# Patient Record
Sex: MALE | Race: WHITE | NOT HISPANIC OR LATINO | Employment: OTHER | URBAN - METROPOLITAN AREA
[De-identification: names, ages, dates, MRNs, and addresses within clinical notes are randomized per-mention and may not be internally consistent; named-entity substitution may affect disease eponyms.]

---

## 2019-04-15 ENCOUNTER — HOSPITAL ENCOUNTER (INPATIENT)
Facility: MEDICAL CENTER | Age: 59
LOS: 2 days | DRG: 918 | End: 2019-04-17
Attending: EMERGENCY MEDICINE | Admitting: INTERNAL MEDICINE
Payer: COMMERCIAL

## 2019-04-15 DIAGNOSIS — R45.851 SUICIDAL IDEATION: ICD-10-CM

## 2019-04-15 DIAGNOSIS — T39.392A OVERDOSE OF NONSTEROIDAL ANTI-INFLAMMATORY DRUG (NSAID), INTENTIONAL SELF-HARM, INITIAL ENCOUNTER (HCC): ICD-10-CM

## 2019-04-15 DIAGNOSIS — F10.921 ALCOHOL INTOXICATION WITH DELIRIUM (HCC): ICD-10-CM

## 2019-04-15 PROBLEM — E07.9 THYROID DISEASE: Status: ACTIVE | Noted: 2019-04-15

## 2019-04-15 PROBLEM — E87.29 INCREASED ANION GAP METABOLIC ACIDOSIS: Status: ACTIVE | Noted: 2019-04-15

## 2019-04-15 PROBLEM — R40.0 SOMNOLENCE: Status: ACTIVE | Noted: 2019-04-15

## 2019-04-15 PROBLEM — F10.929 ALCOHOLIC INTOXICATION WITH COMPLICATION (HCC): Status: ACTIVE | Noted: 2019-04-15

## 2019-04-15 PROBLEM — T39.312A: Status: ACTIVE | Noted: 2019-04-15

## 2019-04-15 LAB
ALBUMIN SERPL BCP-MCNC: 4.8 G/DL (ref 3.2–4.9)
ALBUMIN/GLOB SERPL: 1.7 G/DL
ALP SERPL-CCNC: 121 U/L (ref 30–99)
ALT SERPL-CCNC: 50 U/L (ref 2–50)
AMPHET UR QL SCN: NEGATIVE
ANION GAP SERPL CALC-SCNC: 16 MMOL/L (ref 0–11.9)
ANION GAP SERPL CALC-SCNC: 17 MMOL/L (ref 0–11.9)
APAP SERPL-MCNC: <10 UG/ML (ref 10–30)
APPEARANCE UR: CLEAR
APTT PPP: 27.5 SEC (ref 24.7–36)
AST SERPL-CCNC: 45 U/L (ref 12–45)
BARBITURATES UR QL SCN: NEGATIVE
BASE EXCESS BLDV CALC-SCNC: -3 MMOL/L
BASOPHILS # BLD AUTO: 0.6 % (ref 0–1.8)
BASOPHILS # BLD: 0.06 K/UL (ref 0–0.12)
BENZODIAZ UR QL SCN: NEGATIVE
BILIRUB SERPL-MCNC: 0.7 MG/DL (ref 0.1–1.5)
BILIRUB UR QL STRIP.AUTO: NEGATIVE
BODY TEMPERATURE: ABNORMAL CENTIGRADE
BUN SERPL-MCNC: 12 MG/DL (ref 8–22)
BUN SERPL-MCNC: 9 MG/DL (ref 8–22)
BZE UR QL SCN: NEGATIVE
CALCIUM SERPL-MCNC: 7.8 MG/DL (ref 8.5–10.5)
CALCIUM SERPL-MCNC: 8.9 MG/DL (ref 8.5–10.5)
CANNABINOIDS UR QL SCN: NEGATIVE
CHLORIDE SERPL-SCNC: 102 MMOL/L (ref 96–112)
CHLORIDE SERPL-SCNC: 109 MMOL/L (ref 96–112)
CO2 SERPL-SCNC: 17 MMOL/L (ref 20–33)
CO2 SERPL-SCNC: 20 MMOL/L (ref 20–33)
COLOR UR: YELLOW
CREAT SERPL-MCNC: 1.2 MG/DL (ref 0.5–1.4)
CREAT SERPL-MCNC: 1.24 MG/DL (ref 0.5–1.4)
EKG IMPRESSION: NORMAL
EOSINOPHIL # BLD AUTO: 0.16 K/UL (ref 0–0.51)
EOSINOPHIL NFR BLD: 1.7 % (ref 0–6.9)
ERYTHROCYTE [DISTWIDTH] IN BLOOD BY AUTOMATED COUNT: 43.7 FL (ref 35.9–50)
ETHANOL BLD-MCNC: 0.11 G/DL
GLOBULIN SER CALC-MCNC: 2.8 G/DL (ref 1.9–3.5)
GLUCOSE SERPL-MCNC: 81 MG/DL (ref 65–99)
GLUCOSE SERPL-MCNC: 88 MG/DL (ref 65–99)
GLUCOSE UR STRIP.AUTO-MCNC: NEGATIVE MG/DL
HCO3 BLDV-SCNC: 23 MMOL/L (ref 24–28)
HCT VFR BLD AUTO: 46.9 % (ref 42–52)
HGB BLD-MCNC: 15.9 G/DL (ref 14–18)
IMM GRANULOCYTES # BLD AUTO: 0.06 K/UL (ref 0–0.11)
IMM GRANULOCYTES NFR BLD AUTO: 0.6 % (ref 0–0.9)
INR PPP: 1.24 (ref 0.87–1.13)
KETONES UR STRIP.AUTO-MCNC: NEGATIVE MG/DL
LACTATE BLD-SCNC: 1.7 MMOL/L (ref 0.5–2)
LEUKOCYTE ESTERASE UR QL STRIP.AUTO: NEGATIVE
LYMPHOCYTES # BLD AUTO: 3.63 K/UL (ref 1–4.8)
LYMPHOCYTES NFR BLD: 38.8 % (ref 22–41)
MAGNESIUM SERPL-MCNC: 2.4 MG/DL (ref 1.5–2.5)
MCH RBC QN AUTO: 31.4 PG (ref 27–33)
MCHC RBC AUTO-ENTMCNC: 33.9 G/DL (ref 33.7–35.3)
MCV RBC AUTO: 92.5 FL (ref 81.4–97.8)
METHADONE UR QL SCN: NEGATIVE
MICRO URNS: NORMAL
MONOCYTES # BLD AUTO: 0.9 K/UL (ref 0–0.85)
MONOCYTES NFR BLD AUTO: 9.6 % (ref 0–13.4)
NEUTROPHILS # BLD AUTO: 4.55 K/UL (ref 1.82–7.42)
NEUTROPHILS NFR BLD: 48.7 % (ref 44–72)
NITRITE UR QL STRIP.AUTO: NEGATIVE
NRBC # BLD AUTO: 0 K/UL
NRBC BLD-RTO: 0 /100 WBC
OPIATES UR QL SCN: NEGATIVE
OXYCODONE UR QL SCN: NEGATIVE
PCO2 BLDV: 43.5 MMHG (ref 41–51)
PCP UR QL SCN: NEGATIVE
PH BLDV: 7.34 [PH] (ref 7.31–7.45)
PH UR STRIP.AUTO: 5 [PH]
PLATELET # BLD AUTO: 310 K/UL (ref 164–446)
PMV BLD AUTO: 9 FL (ref 9–12.9)
PO2 BLDV: 49.5 MMHG (ref 25–40)
POTASSIUM SERPL-SCNC: 3.8 MMOL/L (ref 3.6–5.5)
POTASSIUM SERPL-SCNC: 4 MMOL/L (ref 3.6–5.5)
PROPOXYPH UR QL SCN: NEGATIVE
PROT SERPL-MCNC: 7.6 G/DL (ref 6–8.2)
PROT UR QL STRIP: NEGATIVE MG/DL
PROTHROMBIN TIME: 15.7 SEC (ref 12–14.6)
RBC # BLD AUTO: 5.07 M/UL (ref 4.7–6.1)
RBC UR QL AUTO: NEGATIVE
SALICYLATES SERPL-MCNC: 0 MG/DL (ref 15–25)
SAO2 % BLDV: 82.1 %
SODIUM SERPL-SCNC: 139 MMOL/L (ref 135–145)
SODIUM SERPL-SCNC: 142 MMOL/L (ref 135–145)
SP GR UR STRIP.AUTO: 1.01
TROPONIN I SERPL-MCNC: <0.01 NG/ML (ref 0–0.04)
UROBILINOGEN UR STRIP.AUTO-MCNC: 0.2 MG/DL
WBC # BLD AUTO: 9.4 K/UL (ref 4.8–10.8)

## 2019-04-15 PROCEDURE — 82272 OCCULT BLD FECES 1-3 TESTS: CPT

## 2019-04-15 PROCEDURE — 99223 1ST HOSP IP/OBS HIGH 75: CPT | Performed by: PSYCHIATRY & NEUROLOGY

## 2019-04-15 PROCEDURE — 700101 HCHG RX REV CODE 250: Performed by: STUDENT IN AN ORGANIZED HEALTH CARE EDUCATION/TRAINING PROGRAM

## 2019-04-15 PROCEDURE — C9113 INJ PANTOPRAZOLE SODIUM, VIA: HCPCS | Performed by: STUDENT IN AN ORGANIZED HEALTH CARE EDUCATION/TRAINING PROGRAM

## 2019-04-15 PROCEDURE — 85730 THROMBOPLASTIN TIME PARTIAL: CPT

## 2019-04-15 PROCEDURE — 700111 HCHG RX REV CODE 636 W/ 250 OVERRIDE (IP): Performed by: EMERGENCY MEDICINE

## 2019-04-15 PROCEDURE — 770020 HCHG ROOM/CARE - TELE (206)

## 2019-04-15 PROCEDURE — 80048 BASIC METABOLIC PNL TOTAL CA: CPT

## 2019-04-15 PROCEDURE — 700105 HCHG RX REV CODE 258: Performed by: STUDENT IN AN ORGANIZED HEALTH CARE EDUCATION/TRAINING PROGRAM

## 2019-04-15 PROCEDURE — 83735 ASSAY OF MAGNESIUM: CPT

## 2019-04-15 PROCEDURE — 99285 EMERGENCY DEPT VISIT HI MDM: CPT

## 2019-04-15 PROCEDURE — 80053 COMPREHEN METABOLIC PANEL: CPT

## 2019-04-15 PROCEDURE — 84484 ASSAY OF TROPONIN QUANT: CPT

## 2019-04-15 PROCEDURE — 85025 COMPLETE CBC W/AUTO DIFF WBC: CPT

## 2019-04-15 PROCEDURE — 80307 DRUG TEST PRSMV CHEM ANLYZR: CPT

## 2019-04-15 PROCEDURE — 96365 THER/PROPH/DIAG IV INF INIT: CPT

## 2019-04-15 PROCEDURE — A9270 NON-COVERED ITEM OR SERVICE: HCPCS | Performed by: STUDENT IN AN ORGANIZED HEALTH CARE EDUCATION/TRAINING PROGRAM

## 2019-04-15 PROCEDURE — 700111 HCHG RX REV CODE 636 W/ 250 OVERRIDE (IP): Performed by: STUDENT IN AN ORGANIZED HEALTH CARE EDUCATION/TRAINING PROGRAM

## 2019-04-15 PROCEDURE — 81003 URINALYSIS AUTO W/O SCOPE: CPT

## 2019-04-15 PROCEDURE — 36415 COLL VENOUS BLD VENIPUNCTURE: CPT

## 2019-04-15 PROCEDURE — 85610 PROTHROMBIN TIME: CPT

## 2019-04-15 PROCEDURE — 700102 HCHG RX REV CODE 250 W/ 637 OVERRIDE(OP): Performed by: STUDENT IN AN ORGANIZED HEALTH CARE EDUCATION/TRAINING PROGRAM

## 2019-04-15 PROCEDURE — 82803 BLOOD GASES ANY COMBINATION: CPT

## 2019-04-15 PROCEDURE — 99223 1ST HOSP IP/OBS HIGH 75: CPT | Mod: GC | Performed by: INTERNAL MEDICINE

## 2019-04-15 PROCEDURE — 83605 ASSAY OF LACTIC ACID: CPT

## 2019-04-15 PROCEDURE — 96375 TX/PRO/DX INJ NEW DRUG ADDON: CPT

## 2019-04-15 PROCEDURE — C9113 INJ PANTOPRAZOLE SODIUM, VIA: HCPCS | Performed by: EMERGENCY MEDICINE

## 2019-04-15 PROCEDURE — 93005 ELECTROCARDIOGRAM TRACING: CPT | Performed by: STUDENT IN AN ORGANIZED HEALTH CARE EDUCATION/TRAINING PROGRAM

## 2019-04-15 RX ORDER — LORAZEPAM 1 MG/1
1 TABLET ORAL EVERY 4 HOURS PRN
Status: DISCONTINUED | OUTPATIENT
Start: 2019-04-15 | End: 2019-04-15

## 2019-04-15 RX ORDER — FOLIC ACID 1 MG/1
1 TABLET ORAL DAILY
Status: DISCONTINUED | OUTPATIENT
Start: 2019-04-16 | End: 2019-04-15

## 2019-04-15 RX ORDER — PANTOPRAZOLE SODIUM 40 MG/10ML
40 INJECTION, POWDER, LYOPHILIZED, FOR SOLUTION INTRAVENOUS 2 TIMES DAILY
Status: DISCONTINUED | OUTPATIENT
Start: 2019-04-15 | End: 2019-04-16

## 2019-04-15 RX ORDER — LORAZEPAM 2 MG/ML
1 INJECTION INTRAMUSCULAR
Status: DISCONTINUED | OUTPATIENT
Start: 2019-04-15 | End: 2019-04-15

## 2019-04-15 RX ORDER — SODIUM CHLORIDE 9 MG/ML
500 INJECTION, SOLUTION INTRAVENOUS ONCE
Status: COMPLETED | OUTPATIENT
Start: 2019-04-15 | End: 2019-04-15

## 2019-04-15 RX ORDER — LORAZEPAM 1 MG/1
4 TABLET ORAL
Status: DISCONTINUED | OUTPATIENT
Start: 2019-04-15 | End: 2019-04-15

## 2019-04-15 RX ORDER — POTASSIUM CHLORIDE 20 MEQ/1
20 TABLET, EXTENDED RELEASE ORAL ONCE
Status: COMPLETED | OUTPATIENT
Start: 2019-04-15 | End: 2019-04-15

## 2019-04-15 RX ORDER — LORAZEPAM 2 MG/ML
1-2 INJECTION INTRAMUSCULAR
Status: DISCONTINUED | OUTPATIENT
Start: 2019-04-15 | End: 2019-04-17 | Stop reason: HOSPADM

## 2019-04-15 RX ORDER — POLYETHYLENE GLYCOL 3350 17 G/17G
1 POWDER, FOR SOLUTION ORAL
Status: DISCONTINUED | OUTPATIENT
Start: 2019-04-15 | End: 2019-04-17 | Stop reason: HOSPADM

## 2019-04-15 RX ORDER — THIAMINE MONONITRATE (VIT B1) 100 MG
100 TABLET ORAL DAILY
Status: DISCONTINUED | OUTPATIENT
Start: 2019-04-16 | End: 2019-04-15

## 2019-04-15 RX ORDER — LORAZEPAM 2 MG/ML
0.5 INJECTION INTRAMUSCULAR EVERY 4 HOURS PRN
Status: DISCONTINUED | OUTPATIENT
Start: 2019-04-15 | End: 2019-04-15

## 2019-04-15 RX ORDER — LORAZEPAM 2 MG/ML
2 INJECTION INTRAMUSCULAR
Status: DISCONTINUED | OUTPATIENT
Start: 2019-04-15 | End: 2019-04-15

## 2019-04-15 RX ORDER — SODIUM CHLORIDE 9 MG/ML
INJECTION, SOLUTION INTRAVENOUS CONTINUOUS
Status: DISCONTINUED | OUTPATIENT
Start: 2019-04-15 | End: 2019-04-17

## 2019-04-15 RX ORDER — ONDANSETRON 2 MG/ML
4 INJECTION INTRAMUSCULAR; INTRAVENOUS EVERY 4 HOURS PRN
Status: DISCONTINUED | OUTPATIENT
Start: 2019-04-15 | End: 2019-04-17 | Stop reason: HOSPADM

## 2019-04-15 RX ORDER — LORAZEPAM 1 MG/1
3 TABLET ORAL
Status: DISCONTINUED | OUTPATIENT
Start: 2019-04-15 | End: 2019-04-15

## 2019-04-15 RX ORDER — DEXTROSE MONOHYDRATE 25 G/50ML
25 INJECTION, SOLUTION INTRAVENOUS
Status: DISCONTINUED | OUTPATIENT
Start: 2019-04-15 | End: 2019-04-15

## 2019-04-15 RX ORDER — LORAZEPAM 1 MG/1
2 TABLET ORAL
Status: DISCONTINUED | OUTPATIENT
Start: 2019-04-15 | End: 2019-04-15

## 2019-04-15 RX ORDER — LORAZEPAM 1 MG/1
0.5 TABLET ORAL EVERY 4 HOURS PRN
Status: DISCONTINUED | OUTPATIENT
Start: 2019-04-15 | End: 2019-04-15

## 2019-04-15 RX ORDER — LORAZEPAM 2 MG/ML
1.5 INJECTION INTRAMUSCULAR
Status: DISCONTINUED | OUTPATIENT
Start: 2019-04-15 | End: 2019-04-15

## 2019-04-15 RX ORDER — PANTOPRAZOLE SODIUM 40 MG/10ML
80 INJECTION, POWDER, LYOPHILIZED, FOR SOLUTION INTRAVENOUS ONCE
Status: COMPLETED | OUTPATIENT
Start: 2019-04-15 | End: 2019-04-15

## 2019-04-15 RX ORDER — BISACODYL 10 MG
10 SUPPOSITORY, RECTAL RECTAL
Status: DISCONTINUED | OUTPATIENT
Start: 2019-04-15 | End: 2019-04-17 | Stop reason: HOSPADM

## 2019-04-15 RX ORDER — AMOXICILLIN 250 MG
2 CAPSULE ORAL 2 TIMES DAILY
Status: DISCONTINUED | OUTPATIENT
Start: 2019-04-15 | End: 2019-04-17 | Stop reason: HOSPADM

## 2019-04-15 RX ADMIN — PANTOPRAZOLE SODIUM 40 MG: 40 INJECTION, POWDER, LYOPHILIZED, FOR SOLUTION INTRAVENOUS at 17:34

## 2019-04-15 RX ADMIN — SODIUM CHLORIDE: 9 INJECTION, SOLUTION INTRAVENOUS at 09:33

## 2019-04-15 RX ADMIN — THIAMINE HYDROCHLORIDE: 100 INJECTION, SOLUTION INTRAMUSCULAR; INTRAVENOUS at 08:54

## 2019-04-15 RX ADMIN — SODIUM CHLORIDE 500 ML: 9 INJECTION, SOLUTION INTRAVENOUS at 08:53

## 2019-04-15 RX ADMIN — POTASSIUM CHLORIDE 20 MEQ: 1500 TABLET, EXTENDED RELEASE ORAL at 21:27

## 2019-04-15 RX ADMIN — PANTOPRAZOLE SODIUM 80 MG: 40 INJECTION, POWDER, LYOPHILIZED, FOR SOLUTION INTRAVENOUS at 05:31

## 2019-04-15 RX ADMIN — SODIUM CHLORIDE: 9 INJECTION, SOLUTION INTRAVENOUS at 17:38

## 2019-04-15 ASSESSMENT — ENCOUNTER SYMPTOMS
ABDOMINAL PAIN: 0
CONSTIPATION: 0
DIARRHEA: 0
HEARTBURN: 0
NAUSEA: 0
DEPRESSION: 1
VOMITING: 0
BLOOD IN STOOL: 0

## 2019-04-15 ASSESSMENT — COPD QUESTIONNAIRES
DO YOU EVER COUGH UP ANY MUCUS OR PHLEGM?: NO/ONLY WITH OCCASIONAL COLDS OR INFECTIONS
COPD SCREENING SCORE: 1
DURING THE PAST 4 WEEKS HOW MUCH DID YOU FEEL SHORT OF BREATH: NONE/LITTLE OF THE TIME
HAVE YOU SMOKED AT LEAST 100 CIGARETTES IN YOUR ENTIRE LIFE: NO/DON'T KNOW

## 2019-04-15 ASSESSMENT — LIFESTYLE VARIABLES
SUBSTANCE_ABUSE: 1
EVER_SMOKED: NEVER

## 2019-04-15 NOTE — SENIOR ADMIT NOTE
HPI: Patient is a 58-year-old man who was brought in by EMS from his home, it is unclear who called EMS, due to taking approximately 40 tablets of 800 mg ibuprofen in an attempt to harm himself.  Per nursing staff on arrival he was awake and talking in full sentences, making sense.  Per ED physician, he was altered, not speaking in full sentences and unable to give a full history.  They were able to understand that he took about 40 tablets of 800 mg ibuprofen.  Upon S questioning, he states that he had some epigastric pain and that is why he took the medicine.  It is still difficult to obtain history, as patient is altered and slurring his speech.    Physical exam: Overweight, somnolent, slurring his speech and not speaking in full sentences.  Answers yes or no questions only.  Satting 100% on room air, taking deep breaths.  No respiratory distress or depression.   Cardiac: Normal S1 and S2, no murmurs rubs or gallops auscultated.  Abdomen: Obese, nl. BS. Nontender.  No petechiae seen.  No signs of active bleeding.     Assessment/plan    Ibuprofen overdose  Presenting with altered mental status - no sign of GI bleed.   -Has elevated anion gap, with normal pH  -UDS negative, 0 Tylenol/salicylates in serum - per poison control possibly ingested something else as well.  -BAL 0.11  -Anion gap 17, H CO2 20  -VBG: PH 7.34, PCO2 43.5 - not retaining CO2      Plan:  Admit to telemetry  EKG  FIT  Legal hold - psych was consulted in the ED, and will see patient when no longer inebriated.   CIWA - without lorazepam while acutely intoxicated and altered.   Poison control already aware of patient, and have no further recommendations on further tests.

## 2019-04-15 NOTE — CONSULTS
Alert Team  Pt not seen by Alert Team in ER.    Pt being admitted to Tele unit for medical stabilization.  Pt to see Psychiatry while inpatient to address SI.

## 2019-04-15 NOTE — CARE PLAN
Problem: Safety  Goal: Will remain free from falls  Fall precautions in place. Bed wheels locked, bed is in the lowest position. Bed alarm on and in place. Non-skid socks provided. Patient provides successful verbalization of fall and safety precautions. Upper side rails are up. Hourly rounding in progress.     Problem: Knowledge Deficit  Goal: Knowledge of disease process/condition, treatment plan, diagnostic tests, and medications will improve  POC discussed with the patient and family. All questions and concerns addressed and answered. Patient is involved in POC and verbalizes the understanding of the disease process, medications, tests and treatments. Questions on POC encouraged throughout care.       Problem: Skin Integrity  Goal: Risk for impaired skin integrity will decrease  Skin is assessed for integrity throughout the shift. Pt is encouraged to reposition and is turned at least every 2 hours. Pt is kept dry and clean.

## 2019-04-15 NOTE — ED NOTES
Pt report called in to T7 RN, questions and comments addressed. Pt now awaiting transport to T718.

## 2019-04-15 NOTE — PSYCHIATRY
"PSYCHIATRIC INTAKE EVALUATION     *Reason for admission: reports that he took 40 800mg Ibuprofen in attempt to end his life this evening. Pt reports to worsening depression. Pt reports he has had thoughts in the past but never a previous attempt to harm himself. Pt is A&Ox4. Per EMS pt was at home when they were called.                   *Reason for consult: suicide attempt  *Requesting Physician/APN: Manish Howell II, M.D.            Legal Hold on admission: +           *Chief Complaint: too obtunded to obtained.       *HPI (includes Psychiatric ROS):  57 yo male who while intoxicated tried to overdose on motrin. One note indicates it was a suicide attempt. Another indicates his stomach was hurting so he took the motrin to stop the pain. However, if the report is accurate, the amount of pills taken puts his judgment in question or, it was a SA.     He is too obtunded when seen by psych to obtain any meaningful information. No prior visits to [facility].      *Medical Review Of Symptoms (not dx conditions): too obtunded to participate  ROS     *Psychiatric Examination:   Vitals: Blood pressure 147/84, pulse 82, temperature 35.9 °C (96.6 °F), temperature source Temporal, resp. rate 14, height 1.676 m (5' 6\"), weight 107 kg (236 lb), SpO2 93 %.  General Appearance: unable to cooperate. Obtunded.  Abnormal Movements:none noted  Gait and Posture:not observed  Speech:none  Thought Process: too obtunded to participate  Associations:too obtunded to participate  Abnormal or Psychotic Thoughts:too obtunded to participate  Judgement and Insight:poor  Orientation:too obtunded to participate  Recent and Remote Memory:too obtunded to participate  Attention Span and Concentration:poor  Language:too obtunded to participate  Fund of Knowledge:too obtunded to participate  Mood and Affect:presumably depressed given that he reported depression/blunted.  SI/HI: has reported +SI and no HI     *PAST MEDICAL/PSYCH/FAMILY/SOCIAL(as " "reported by patient):       *medical hx: too obtunded to participate           Past Medical History:   Diagnosis Date   • Disorder of thyroid      History reviewed. No pertinent surgical history.     *psychiatric hx: too obtunded to participate  SAs:per notes, no prior hx.  Guns:unknown  Hx of Violence: unknown  Hospitalizations:unknown  Med Hx:unknown  Dx Hx:unknown     *family Psych hx: too obtunded to participate      *social hx: per EMS: he was \"at home\"  Alcohol: pt denied drinking but is + for same  Drugs:denied     *MEDICAL HX: labs, MARS, medications, etc were reviewed. Only those findings of potential interest to psychiatry are noted below:    *Current Medical issues:  Intoxicated and obtunded      *Allergies:  No Known Allergies  *Current Medications:    Current Facility-Administered Medications:   •  senna-docusate (PERICOLACE or SENOKOT S) 8.6-50 MG per tablet 2 Tab, 2 Tab, Oral, BID, Stopped at 04/15/19 0715 **AND** polyethylene glycol/lytes (MIRALAX) PACKET 1 Packet, 1 Packet, Oral, QDAY PRN **AND** magnesium hydroxide (MILK OF MAGNESIA) suspension 30 mL, 30 mL, Oral, QDAY PRN **AND** bisacodyl (DULCOLAX) suppository 10 mg, 10 mg, Rectal, QDAY PRN, Talia Villegas M.D.  •  Respiratory Care per Protocol, , Nebulization, Continuous RT, Talia Villegas M.D.  •  NS infusion, , Intravenous, Continuous, Talia Villegas M.D., Last Rate: 150 mL/hr at 04/15/19 1409  •  pantoprazole (PROTONIX) injection 40 mg, 40 mg, Intravenous, BID, Talia Villegas M.D.  •  ondansetron (ZOFRAN) syringe/vial injection 4 mg, 4 mg, Intravenous, Q4HRS PRN, Talia Villegas M.D.  •  LORazepam (ATIVAN) injection 1-2 mg, 1-2 mg, Intravenous, Once PRN, Talia Villegas M.D.  *EKG: personally reviewed: [id number]  *Imaging: none   EEG:  none     *Labs:  Recent Labs      04/15/19   0323   WBC  9.4   RBC  5.07   HEMOGLOBIN  15.9   HEMATOCRIT  46.9   MCV  92.5   MCH  31.4   RDW  43.7   PLATELETCT  310   MPV  9.0   NEUTSPOLYS  48.70 "   LYMPHOCYTES  38.80   MONOCYTES  9.60   EOSINOPHILS  1.70   BASOPHILS  0.60     Lab Results   Component Value Date/Time    SODIUM 139 04/15/2019 03:23 AM    POTASSIUM 4.0 04/15/2019 03:23 AM    CHLORIDE 102 04/15/2019 03:23 AM    CO2 20 04/15/2019 03:23 AM    GLUCOSE 88 04/15/2019 03:23 AM    BUN 9 04/15/2019 03:23 AM    CREATININE 1.24 04/15/2019 03:23 AM         No results found for: BREATHALIZER  BAL 0.11     Lab Results  Component Value Date/Time   AMPHUR Negative 04/15/2019 0433   BARBSURINE Negative 04/15/2019 0433   BENZODIAZU Negative 04/15/2019 0433   COCAINEMET Negative 04/15/2019 0433   METHADONE Negative 04/15/2019 0433   OPIATES Negative 04/15/2019 0433   OXYCODN Negative 04/15/2019 0433   PCPURINE Negative 04/15/2019 0433   PROPOXY Negative 04/15/2019 0433   CANNABINOID Negative 04/15/2019 0433     No results found for: TSH, FREET4      *ASSESSMENT/PLAN:  1. Acute alcohol intoxication    2. Depressive disorder unspec  - R/O adjustment disorder with depressed mood  -R/O other mood disorder  -R/O alcohol induced mood disorder    2. R/O alcohol use disorder    - BAL 0.11    Legal hold: extended. [facility]. Right now he is being admitted to the floor but [facility] be [facility]. inpt psychiatry has been notified to follow pt: please call when pt is able to participate in an interview.    Other:   Sitter: no   Visitors:no   Phone calls:no   Personal items (specify):no  *Will Follow  *Thank you for the consult

## 2019-04-15 NOTE — H&P
Internal Medicine Admitting History and Physical    Note Author: Talia Villegas M.D.       Name Alonzo San     1960   Age/Sex 58 y.o. male   MRN [id number]   Code Status FULL     After 5PM or if no immediate response to page, please call for cross-coverage  Attending/Team: Dr. Faulkner/KENNY Vallejo See Patient List for primary contact information  Call [phone] to page    1st Call - Day Intern (R1):   Dr. Villegas 2nd Call - Day Sr. Resident (R2/R3):   Dr. Monique       Chief Complaint:   Suicidal attempt with Ibuprofen overdose    HPI:  This is a 58 years old male first time presented in [facility] ER, no known significant past medical history (patient somnolent on examination, unable to give history) was brought in for ibuprofen overdose.     Patient was somnolent during interview, unable to get any history from the patient. Per nursing staff, on arrival he was more awake and could perform in conversation. Per chart review, while talking to ER physician, he was able to make conversation but provided very minimal history. The patient stated to ER physician that he took ibuprofen in attempt to harm herself, he could not recall the time of ingestion.  Per nursing, he took 40 pills of 800 mg ibuprofen.  No previous suicidal attempts in the past, only suicidal thoughts. He was brought in by EMS (unclear who called EMS).     During H&P, somnolent, not speaking in full sentence. Only occasional 'yes' and 'no'. Breathing comfortably in RA, O2 sat > 92 %, says 'no' to presence of any pain anywhere in the body/ difficulty in breathing.     ER course: Vitals normal, Labs: grossly normal except elevated AG, normal PH. UDS negative, salisylates/ Tylenol neg, VBG: doesn't show CO2 retention. BAL 0.11. He received 80mg IV Protonix, Poision control was contacted who agree with management. Psych was called and he was started on legal hold.     Review of Systems   Reason unable to perform ROS: Limited due to patient  "condition. Patient somnolent, arousable with command, not participating properly in conversation.    Gastrointestinal: Negative for abdominal pain, blood in stool, constipation, diarrhea, heartburn, melena, nausea and vomiting.             Past Medical History (Chronic medical problem, known complications and current treatment)    No previous chart to review, patient too drowsy to give PMH    Past Surgical History:  Unable to get due to patient condition    Current Outpatient Medications:  Home Medications     Reviewed by Palak Sanchez R.N. (Registered Nurse) on 04/15/19 at 0328  Med List Status: Partial   Medication Last Dose Status        Patient Nathaniel Taking any Medications                       Medication Allergy/Sensitivities:  No Known Allergies      Family History (mandatory)   Mother: DM  MGF: DM  MGF: DM    Social History (mandatory)   Social History     Social History   • Marital status: N/A     Spouse name: N/A   • Number of children: N/A   • Years of education: N/A     Occupational History   • Not on file.     Social History Main Topics   • Smoking status: Former Smoker   • Smokeless tobacco: Never Used   • Alcohol use Yes   • Drug use: No   • Sexual activity: Not on file     Other Topics Concern   • Not on file     Social History Narrative   • No narrative on file     Living situation: Home  PCP : No primary care provider on file.    Physical Exam     Vitals:    04/15/19 0900 04/15/19 0930 04/15/19 1000 04/15/19 1045   BP:    147/84   Pulse: 84 83 86 82   Resp:    14   Temp:    35.9 °C (96.6 °F)   TempSrc:    Temporal   SpO2: 96% 96% 96% 93%   Weight:       Height:         Body mass index is 38.09 kg/m².  /84   Pulse 82   Temp 35.9 °C (96.6 °F) (Temporal)   Resp 14   Ht 1.676 m (5' 6\")   Wt 107 kg (236 lb)   SpO2 93%   BMI 38.09 kg/m²   O2 therapy: Pulse Oximetry: 93 %, O2 (LPM): 2, O2 Delivery: Silicone Nasal Cannula    Physical Exam   Constitutional: He appears lethargic. He appears " to not be writhing in pain and not dehydrated.  Non-toxic appearance. He does not have a sickly appearance.   HENT:   Head: Normocephalic and atraumatic.   Eyes: Pupils are equal, round, and reactive to light. Right eye exhibits no discharge. Left eye exhibits no discharge.   Neck: Neck supple. No JVD present.   Cardiovascular: Normal rate, regular rhythm, normal heart sounds and intact distal pulses.    No murmur heard.  Pulmonary/Chest: Effort normal and breath sounds normal. No respiratory distress. He has no wheezes. He has no rales.   Abdominal: Soft. Bowel sounds are normal. He exhibits no distension. There is no tenderness.   Musculoskeletal: He exhibits no edema or deformity.   Neurological: He appears lethargic.   Patient drowsy during PE, able to follow simple command like opening eyes but not in full participation for complex commands.    Skin: Skin is warm. No erythema.   Psychiatric: He expresses suicidal ideation. He expresses no suicidal plans and no homicidal plans.   Came after Ibuprofen OD, patient drowsy during PE         Data Review       Old Records Request:   Completed  Current Records review/summary: Completed    Lab Data Review:  Recent Results (from the past 24 hour(s))   DIAGNOSTIC ALCOHOL (BA)    Collection Time: 04/15/19  3:23 AM   Result Value Ref Range    Diagnostic Alcohol 0.11 (H) 0.00 g/dL   CBC WITH DIFFERENTIAL    Collection Time: 04/15/19  3:23 AM   Result Value Ref Range    WBC 9.4 4.8 - 10.8 K/uL    RBC 5.07 4.70 - 6.10 M/uL    Hemoglobin 15.9 14.0 - 18.0 g/dL    Hematocrit 46.9 42.0 - 52.0 %    MCV 92.5 81.4 - 97.8 fL    MCH 31.4 27.0 - 33.0 pg    MCHC 33.9 33.7 - 35.3 g/dL    RDW 43.7 35.9 - 50.0 fL    Platelet Count 310 164 - 446 K/uL    MPV 9.0 9.0 - 12.9 fL    Neutrophils-Polys 48.70 44.00 - 72.00 %    Lymphocytes 38.80 22.00 - 41.00 %    Monocytes 9.60 0.00 - 13.40 %    Eosinophils 1.70 0.00 - 6.90 %    Basophils 0.60 0.00 - 1.80 %    Immature Granulocytes 0.60 0.00 - 0.90  %    Nucleated RBC 0.00 /100 WBC    Neutrophils (Absolute) 4.55 1.82 - 7.42 K/uL    Lymphs (Absolute) 3.63 1.00 - 4.80 K/uL    Monos (Absolute) 0.90 (H) 0.00 - 0.85 K/uL    Eos (Absolute) 0.16 0.00 - 0.51 K/uL    Baso (Absolute) 0.06 0.00 - 0.12 K/uL    Immature Granulocytes (abs) 0.06 0.00 - 0.11 K/uL    NRBC (Absolute) 0.00 K/uL   COMP METABOLIC PANEL    Collection Time: 04/15/19  3:23 AM   Result Value Ref Range    Sodium 139 135 - 145 mmol/L    Potassium 4.0 3.6 - 5.5 mmol/L    Chloride 102 96 - 112 mmol/L    Co2 20 20 - 33 mmol/L    Anion Gap 17.0 (H) 0.0 - 11.9    Glucose 88 65 - 99 mg/dL    Bun 9 8 - 22 mg/dL    Creatinine 1.24 0.50 - 1.40 mg/dL    Calcium 8.9 8.5 - 10.5 mg/dL    AST(SGOT) 45 12 - 45 U/L    ALT(SGPT) 50 2 - 50 U/L    Alkaline Phosphatase 121 (H) 30 - 99 U/L    Total Bilirubin 0.7 0.1 - 1.5 mg/dL    Albumin 4.8 3.2 - 4.9 g/dL    Total Protein 7.6 6.0 - 8.2 g/dL    Globulin 2.8 1.9 - 3.5 g/dL    A-G Ratio 1.7 g/dL   Acetaminophen Level    Collection Time: 04/15/19  3:23 AM   Result Value Ref Range    Acetaminophen -Tylenol <10 10 - 30 ug/mL   SALICYLATE LEVEL    Collection Time: 04/15/19  3:23 AM   Result Value Ref Range    Salicylates, Quant. 0 (L) 15 - 25 mg/dL   ESTIMATED GFR    Collection Time: 04/15/19  3:23 AM   Result Value Ref Range    GFR If African American >60 >60 mL/min/1.73 m 2    GFR If Non African American 60 >60 mL/min/1.73 m 2   VENOUS BLOOD GAS    Collection Time: 04/15/19  4:16 AM   Result Value Ref Range    Venous Bg Ph 7.34 7.31 - 7.45    Venous Bg Pco2 43.5 41.0 - 51.0 mmHg    Venous Bg Po2 49.5 (H) 25.0 - 40.0 mmHg    Venous Bg O2 Saturation 82.1 %    Venous Bg Hco3 23 (L) 24 - 28 mmol/L    Venous Bg Base Excess -3 mmol/L    Body Temp see below Centigrade   Urine Drug Screen    Collection Time: 04/15/19  4:33 AM   Result Value Ref Range    Amphetamines Urine Negative Negative    Barbiturates Negative Negative    Benzodiazepines Negative Negative    Cocaine Metabolite  Negative Negative    Methadone Negative Negative    Opiates Negative Negative    Oxycodone Negative Negative    Phencyclidine -Pcp Negative Negative    Propoxyphene Negative Negative    Cannabinoid Metab Negative Negative   EKG    Collection Time: 04/15/19  7:23 AM   Result Value Ref Range    Report       [facility] Emergency Dept[facility]    Test Date:  2019-04-15  Pt Name:    JEAN YEUNG                 Department: ER  MRN:        [id number]                      Room:       [facility]  Gender:     Male                         Technician: [id number]  :        1960                   Requested By:BHASKAR BRIGGS  Order #:    [id number]                    Reading MD:    Measurements  Intervals                                Axis  Rate:       87                           P:          60  RI:         184                          QRS:        30  QRSD:       108                          T:          35  QT:         368  QTc:        443    Interpretive Statements  SINUS RHYTHM  INCOMPLETE RIGHT BUNDLE BRANCH BLOCK  LATE PRECORDIAL R/S TRANSITION  PROBABLE INFERIOR INFARCT, AGE INDETERMINATE  No previous ECG available for comparison     LACTIC ACID    Collection Time: 04/15/19  7:40 AM   Result Value Ref Range    Lactic Acid 1.7 0.5 - 2.0 mmol/L   TROPONIN    Collection Time: 04/15/19  7:40 AM   Result Value Ref Range    Troponin I <0.01 0.00 - 0.04 ng/mL   APTT    Collection Time: 04/15/19 11:44 AM   Result Value Ref Range    APTT 27.5 24.7 - 36.0 sec   Prothrombin Time    Collection Time: 04/15/19 11:44 AM   Result Value Ref Range    PT 15.7 (H) 12.0 - 14.6 sec    INR 1.24 (H) 0.87 - 1.13       Imaging/Procedures Review:    Independant Imaging Review: Completed  No orders to display        EKG:   EKG Independent Review: Completed  NSR, QTc 443.    Records reviewed and summarized in current documentation :  Yes  [employer] teaching service handout given to patient:  No         Assessment/Plan     * Ibuprofen poisoning,  intentional self-harm, initial encounter ([facility])   Assessment & Plan    Patient brought to ER after he overdosed with Ibuprofen pills. Per ER nursing, he took 40 of 800 mg pills. He was more A&O when he came in initially, now more somnolent. However arousable with command. Labs didn't show any gross abnormality, UDS, Salicylate/ Acetaminophen and Blood gas level normal. Breathing comfortably in room air, RR/ O2 sat normal. Poison control alerted from ER, no new recommendation.   - Given that he took 40 of 800 mg pills, (299 mg/kg for his body weight), it's unlikely to cause toxicity. He might have taken more or combination with something else. Unable to get history as he is somnolent now.  - Ibuprofen toxicity does have some neurologic dysfunction, unclear mechanism of action.   - Vitals normal, RR and pilse ox normal, Blood gas didn't show CO2 accumulation.   - Admission in telemetry, continuous pulse ox. If becomes more somnolent/ respiratory compromise, might need intubation.  - NPO until more alert.  - NS bolus of 500, then maintenance fluid with 150ml/hour for nutrition and kidney protection. No signs of volume overload on admission.   - Received 80 mg IV protonix in ER, cont 40 mg IV BID.   - Cont Legal Hold  - Psych called from ER, but patient has a BAL 0.12, reconsult/ call when no longer intoxicated.   - Fall, Aspiration, seizure precaution.   - LA, FOBT ordered, pending.      Somnolence   Assessment & Plan    See Ibuprofen overdose     Alcoholic intoxication with complication ([facility])   Assessment & Plan    Patient drowsy and somnolent, arousable with verbal command. Unable to obtain history. BAL on admission 0.11. Started CIWA protocol for monitoring withdrawal status, no Ativan prescribed yet as patient probably still intoxicated.   - Start Ativan per CIWA when appropriate.  - Rally bag and PO vitamins per protocol  - Seizure, Fall, aspiration precaution  - NPO now, bed side swallow eval when appropriate        Thyroid disease   Assessment & Plan    H/O thyroid disease, no records in file. Unsure how much supplementation he was on.   - TSH with reflex to T4 ordered.   - Start supplementation accordingly.      Increased anion gap metabolic acidosis   Assessment & Plan    Anion gap metabolic acidosis probably 2/2 ibuprofen OD  - Bal mildly elevated, not known DM. Blood glucose normal on admission. Salicylate/acetamenophen neg   - Ordered LA, pending         Anticipated Hospital stay:  >2 midnights        Quality Measures  Quality-Core Measures   Reviewed items::  EKG reviewed, Labs reviewed, Radiology images reviewed and Medications reviewed  Merritt catheter::  No Merritt  DVT prophylaxis - mechanical:  SCDs    PCP: No primary care provider on file.

## 2019-04-15 NOTE — DISCHARGE PLANNING
Medical Social Work    MIKAL received a call from Janeth Whitt, ([phone]), requesting to know if pt was admitted for suicidal ideation. Janeth reports that she is with the [facility] in [place], [place]. Janeht further reports that the pt is actively involved with their program and she is trying to find out if the pt is admitted to [facility] and if pt is getting help. [facility] by [facility] Marleni. MIKAL explained that due to HIPAA we are unable to release this information. MIKAL further explained that if the Gaylord she is looking for is a pt at [facility] then the pt will be asked if it is okay to release this information and MIKAL can call her back. Janeth verbalized understanding and will wait for a return call.    MIKAL passed the above information to Case Management on T7 for follow-up.

## 2019-04-15 NOTE — ED NOTES
Pt report received from Palak LEIGH. Pt lying supine, eyes closed. Visible rise and fall of chest. NAD noted.

## 2019-04-15 NOTE — ASSESSMENT & PLAN NOTE
H/O thyroid disease, no records in file. Unsure how much supplementation he was on.   - TSH normal, will start supplementation when med rec is done

## 2019-04-15 NOTE — PROGRESS NOTES
Pt is on Legal Hold 2000.    All personal items removed from room and kept in a secure area.    Hospital clothing only. Removed all sharps and potential dangerous items, telephone cords.  “Stop - Check with Nurse before entering” sign to be placed outside patient’s room    Sitter at bedside 1:1  NPO at this time. Unable to perform the bedside swallow eval.

## 2019-04-15 NOTE — ED TRIAGE NOTES
Alonzo San  58 y.o. male  Chief Complaint   Patient presents with   • Suicidal Ideation   • Alcohol Intoxication     Pt BIB [facility] for SI and ETOH. Pt also reports that he took 40 800mg Ibuprofen in attempt to end his life this evening. Pt reports to worsening depression. Pt reports he has had thoughts in the past but never a previous attempt to harm himself. Pt does have complaints of abdominal pain. Pt is A&Ox4, room safety check completed, pt changed into hospital clothing. All personal items removed and bagged for safety.

## 2019-04-15 NOTE — ED NOTES
Pt updated on poc and admit plans. PIV remains CDI and patent.   Room checked and all pt belongings transported with pt. Pt transported by T7 RN to T718 now. Legal hold paperwork and pt chart transported with pt.

## 2019-04-15 NOTE — ED NOTES
Poison control notified of patients status  Poison control recommendations nothing additional than what the ERP has ordered.     Case Number: [id number]

## 2019-04-15 NOTE — ASSESSMENT & PLAN NOTE
S/P 40 of 800 mg Ibuprofen pills, initially obtunded on admission, mentation cleared up later. Breathing comfortably in RA.   - Given that he took 40 of 800 mg pills, (299 mg/kg for his body weight), it's unlikely to cause toxicity. He denies taking anything else.   - FOBT positive, no gross blood in stool. H&H stable. He needs F/U with PCP later to monitor.   - Continue maintenance fluid with 150ml/hour for nutrition and kidney protection. No signs of volume overload on admission.   - IV protonix changed to PO Omeprazole for GI protection   - Cont Legal Hold  - Psych to re-eval today.   - Fall, Aspiration, seizure precaution.   - Dc'd telemetry, [facility]

## 2019-04-15 NOTE — PROGRESS NOTES
Pt arrived to the unit via gurney escorted by Ron RN on ZOLL. VSS. SR 80, 2L O2.. Assessment complete. A&O x 1, speech slowed, pt is lethargic. Responds to verbal stimuli, mostly sleeping.   Pt is dry heaving. Aspiration, suction , seizure precautions in place.     Tele monitor in place, monitor room notified. Pt denies pain. Fall precautions in place and appropriate signs in place. Bed is locked and in the lowest position. Bed alarm in place. Pt is educated regarding fall precautions and importance of calling the stuff for assistance. Pt denies any additional needs at this time. Will continue to monitor.

## 2019-04-15 NOTE — ASSESSMENT & PLAN NOTE
BAL on admission 0.11. Started CIWA protocol on admission for monitoring withdrawal status, so far no withdrawal symptoms..   - Start Ativan per CIWA if needed  - PO vitamins per protocol  - Seizure, Fall, aspiration precaution  - Passed bed side swallow eval, regular diet with no sharp as tolerated

## 2019-04-15 NOTE — PROGRESS NOTES
Two RN skin check completed with LU Salcedo.  Skin completely intact, no areas of concern noted.

## 2019-04-15 NOTE — ED PROVIDER NOTES
"ED Provider Note    Scribed for BRIE Damian II* by Patricia Meraz. 4/15/2019  4:18 AM    Means of Arrival: EMS  History obtained by: Patient  Limitations: Altered mental status    CHIEF COMPLAINT  Chief Complaint   Patient presents with   • Suicidal Ideation   • Alcohol Intoxication       HPI  Alonzo San is a 58 y.o. male who presents to the Emergency Department for evaluation of suicidal ideation.   Per Nursing, Mr. San was at home when EMS was called. At bedside, Mr. San states he took ibuprofen in attempt to harm himself. He cannot recall time of ingestion. He provides minimal history.     Per Nursing, Mr. San stated he took forty tablets of 800 mg ibuprofen upon initial evaluation. He also reported he has not consumed alcohol in the past two years, however he notes depression has been worse lately. He reported no suicidal attempts in the past, only suicidal thoughts.     He denies daily alcohol use.    HPI is limited secondary to altered mental status.     REVIEW OF SYSTEMS  Review of Systems   Psychiatric/Behavioral: Positive for depression, substance abuse (ibuprofen) and suicidal ideas.     See HPI for further details. ROS is limited secondary to altered mental status.     PAST MEDICAL HISTORY   Depression.     SOCIAL HISTORY  Social History     Social History Main Topics   • Smoking status: Former Smoker   • Smokeless tobacco: Never Used   • Alcohol use Yes   • Drug use: No       SURGICAL HISTORY  patient denies any surgical history    CURRENT MEDICATIONS  Home Medications     Reviewed by Palak Sanchez R.N. (Registered Nurse) on 04/15/19 at 0328  Med List Status: Partial   Medication Last Dose Status        Patient Nathaniel Taking any Medications                       ALLERGIES  No Known Allergies    PHYSICAL EXAM  VITAL SIGNS: /68   Pulse 82   Temp 36.4 °C (97.5 °F) (Temporal)   Resp 15   Ht 1.676 m (5' 6\")   Wt 107 kg (236 lb)   SpO2 94%   BMI 38.09 kg/m²     "   Pulse ox interpretation: I interpret this pulse ox as normal.  Constitutional: Alert. Overweight 58 year old man sitting semi-upright on ED bed.   HENT: No signs of trauma, Bilateral external ears normal, Nose normal.   Eyes: Pupils are equal, Conjunctiva normal, Non-icteric.   Neck: Normal range of motion, No tenderness, Supple, No stridor.   Cardiovascular: Regular rate and rhythm, no murmurs. Symmetric distal pulses. No cyanosis of extremities. No peripheral edema of extremities.  Thorax & Lungs: Normal breath sounds, No respiratory distress, No wheezing, No chest tenderness.   Abdomen: Bowel sounds normal, Soft, No tenderness, No masses, No pulsatile masses. No peritoneal signs.  Skin: Warm, Dry, No erythema, No rash.   Musculoskeletal: Good range of motion in all major joints. No tenderness to palpation or major deformities noted.   Neurologic: Alert, slurred speech. Disoriented to place, time. No facial droop. No extremity drift. No truncal ataxia.   Psychiatric: suicidal thoughts and actions      DIAGNOSTIC STUDIES / PROCEDURES    LABS  Results for orders placed or performed during the hospital encounter of 04/15/19   Urine Drug Screen   Result Value Ref Range    Amphetamines Urine Negative Negative    Barbiturates Negative Negative    Benzodiazepines Negative Negative    Cocaine Metabolite Negative Negative    Methadone Negative Negative    Opiates Negative Negative    Oxycodone Negative Negative    Phencyclidine -Pcp Negative Negative    Propoxyphene Negative Negative    Cannabinoid Metab Negative Negative   DIAGNOSTIC ALCOHOL (BA)   Result Value Ref Range    Diagnostic Alcohol 0.11 (H) 0.00 g/dL   CBC WITH DIFFERENTIAL   Result Value Ref Range    WBC 9.4 4.8 - 10.8 K/uL    RBC 5.07 4.70 - 6.10 M/uL    Hemoglobin 15.9 14.0 - 18.0 g/dL    Hematocrit 46.9 42.0 - 52.0 %    MCV 92.5 81.4 - 97.8 fL    MCH 31.4 27.0 - 33.0 pg    MCHC 33.9 33.7 - 35.3 g/dL    RDW 43.7 35.9 - 50.0 fL    Platelet Count 310 164 -  446 K/uL    MPV 9.0 9.0 - 12.9 fL    Neutrophils-Polys 48.70 44.00 - 72.00 %    Lymphocytes 38.80 22.00 - 41.00 %    Monocytes 9.60 0.00 - 13.40 %    Eosinophils 1.70 0.00 - 6.90 %    Basophils 0.60 0.00 - 1.80 %    Immature Granulocytes 0.60 0.00 - 0.90 %    Nucleated RBC 0.00 /100 WBC    Neutrophils (Absolute) 4.55 1.82 - 7.42 K/uL    Lymphs (Absolute) 3.63 1.00 - 4.80 K/uL    Monos (Absolute) 0.90 (H) 0.00 - 0.85 K/uL    Eos (Absolute) 0.16 0.00 - 0.51 K/uL    Baso (Absolute) 0.06 0.00 - 0.12 K/uL    Immature Granulocytes (abs) 0.06 0.00 - 0.11 K/uL    NRBC (Absolute) 0.00 K/uL   COMP METABOLIC PANEL   Result Value Ref Range    Sodium 139 135 - 145 mmol/L    Potassium 4.0 3.6 - 5.5 mmol/L    Chloride 102 96 - 112 mmol/L    Co2 20 20 - 33 mmol/L    Anion Gap 17.0 (H) 0.0 - 11.9    Glucose 88 65 - 99 mg/dL    Bun 9 8 - 22 mg/dL    Creatinine 1.24 0.50 - 1.40 mg/dL    Calcium 8.9 8.5 - 10.5 mg/dL    AST(SGOT) 45 12 - 45 U/L    ALT(SGPT) 50 2 - 50 U/L    Alkaline Phosphatase 121 (H) 30 - 99 U/L    Total Bilirubin 0.7 0.1 - 1.5 mg/dL    Albumin 4.8 3.2 - 4.9 g/dL    Total Protein 7.6 6.0 - 8.2 g/dL    Globulin 2.8 1.9 - 3.5 g/dL    A-G Ratio 1.7 g/dL   Acetaminophen Level   Result Value Ref Range    Acetaminophen -Tylenol <10 10 - 30 ug/mL   SALICYLATE LEVEL   Result Value Ref Range    Salicylates, Quant. 0 (L) 15 - 25 mg/dL   VENOUS BLOOD GAS   Result Value Ref Range    Venous Bg Ph 7.34 7.31 - 7.45    Venous Bg Pco2 43.5 41.0 - 51.0 mmHg    Venous Bg Po2 49.5 (H) 25.0 - 40.0 mmHg    Venous Bg O2 Saturation 82.1 %    Venous Bg Hco3 23 (L) 24 - 28 mmol/L    Venous Bg Base Excess -3 mmol/L    Body Temp see below Centigrade   ESTIMATED GFR   Result Value Ref Range    GFR If African American >60 >60 mL/min/1.73 m 2    GFR If Non African American 60 >60 mL/min/1.73 m 2     Pertinent Labs & Imaging studies reviewed. (See chart for details)    COURSE & MEDICAL DECISION MAKING  Pertinent Labs & Imaging studies reviewed. (See  chart for details)    This is a 58 y.o. Male with no known medical history who presents with suicidal ideation and alcohol intoxication and the differential diagnosis includes but is not limited to NSAID toxicity, alcohol intoxication, other drug intoxication/toxicity, and suicidal.      3:28 AM Ordered for urine drug screen and diagnostic alcohol to evaluate. Consult to Behavioral Health.     3:51 AM Ordered for CBC with differential, CMP, acetaminophen level, salicylate level, and venous blood gas to evaluate.     4:01 AM Patient will be treated with Protonix injection 80 mg for GI prophylaxis after taking large amount of nsaid and having upper abdominal pain.      6:47 AM  I have spoken with [facility] regarding admission. Lab findings with anion gap. No acidosis. No signs of organ dysfunction except altered mental status. Negative tylenol and aspirin levels. UDS negative. Unlikely infection. Alcohol level only 0.11. Because he is not more lucid, unknown time of ingestion I believe he should be admitted for further observation and treatment. I have started legal hold, but not medically clear. [facility] will admit . I have ordered for lactic acid and to start normal saline infusion.     FINAL IMPRESSION  1. Alcohol intoxication with delirium (HCC)    2. Suicidal ideation    3. Overdose of nonsteroidal anti-inflammatory drug (NSAID), intentional self-harm, initial encounter ([facility])          Patricia TAPIA (Roxann), am scribing for, and in the presence of, BROOKLYN Damian II.    Electronically signed by: Patricia Steve), 4/15/2019    IManish II, M* personally performed the services described in this documentation, as scribed by Patricia Meraz in my presence, and it is both accurate and complete. C.     The note accurately reflects work and decisions made by me.  Manish Howell II  4/15/2019  6:49 AM

## 2019-04-15 NOTE — ASSESSMENT & PLAN NOTE
Anion gap metabolic acidosis probably 2/2 ibuprofen OD  - Trending down  - Bal mildly elevated, not known DM. Blood glucose normal on admission. Salicylate/acetamenophen neg   - LA WNL.

## 2019-04-16 PROBLEM — E83.51 HYPOCALCEMIA: Status: ACTIVE | Noted: 2019-04-16

## 2019-04-16 LAB
ALBUMIN SERPL BCP-MCNC: 4 G/DL (ref 3.2–4.9)
ALBUMIN/GLOB SERPL: 2 G/DL
ALP SERPL-CCNC: 91 U/L (ref 30–99)
ALT SERPL-CCNC: 34 U/L (ref 2–50)
ANION GAP SERPL CALC-SCNC: 11 MMOL/L (ref 0–11.9)
AST SERPL-CCNC: 28 U/L (ref 12–45)
BILIRUB SERPL-MCNC: 0.5 MG/DL (ref 0.1–1.5)
BUN SERPL-MCNC: 15 MG/DL (ref 8–22)
CALCIUM SERPL-MCNC: 7.3 MG/DL (ref 8.5–10.5)
CHLORIDE SERPL-SCNC: 113 MMOL/L (ref 96–112)
CO2 SERPL-SCNC: 18 MMOL/L (ref 20–33)
CREAT SERPL-MCNC: 1.28 MG/DL (ref 0.5–1.4)
GLOBULIN SER CALC-MCNC: 2 G/DL (ref 1.9–3.5)
GLUCOSE SERPL-MCNC: 70 MG/DL (ref 65–99)
HEMOCCULT STL QL: POSITIVE
POTASSIUM SERPL-SCNC: 3.8 MMOL/L (ref 3.6–5.5)
PROT SERPL-MCNC: 6 G/DL (ref 6–8.2)
SODIUM SERPL-SCNC: 142 MMOL/L (ref 135–145)
TSH SERPL DL<=0.005 MIU/L-ACNC: 0.73 UIU/ML (ref 0.38–5.33)

## 2019-04-16 PROCEDURE — A9270 NON-COVERED ITEM OR SERVICE: HCPCS | Performed by: STUDENT IN AN ORGANIZED HEALTH CARE EDUCATION/TRAINING PROGRAM

## 2019-04-16 PROCEDURE — 80053 COMPREHEN METABOLIC PANEL: CPT

## 2019-04-16 PROCEDURE — 770001 HCHG ROOM/CARE - MED/SURG/GYN PRIV*

## 2019-04-16 PROCEDURE — 36415 COLL VENOUS BLD VENIPUNCTURE: CPT

## 2019-04-16 PROCEDURE — 84443 ASSAY THYROID STIM HORMONE: CPT

## 2019-04-16 PROCEDURE — 700111 HCHG RX REV CODE 636 W/ 250 OVERRIDE (IP): Performed by: STUDENT IN AN ORGANIZED HEALTH CARE EDUCATION/TRAINING PROGRAM

## 2019-04-16 PROCEDURE — 99232 SBSQ HOSP IP/OBS MODERATE 35: CPT | Mod: GC | Performed by: INTERNAL MEDICINE

## 2019-04-16 PROCEDURE — C9113 INJ PANTOPRAZOLE SODIUM, VIA: HCPCS | Performed by: STUDENT IN AN ORGANIZED HEALTH CARE EDUCATION/TRAINING PROGRAM

## 2019-04-16 PROCEDURE — 700105 HCHG RX REV CODE 258: Performed by: STUDENT IN AN ORGANIZED HEALTH CARE EDUCATION/TRAINING PROGRAM

## 2019-04-16 PROCEDURE — A9270 NON-COVERED ITEM OR SERVICE: HCPCS | Performed by: INTERNAL MEDICINE

## 2019-04-16 PROCEDURE — 700102 HCHG RX REV CODE 250 W/ 637 OVERRIDE(OP): Performed by: STUDENT IN AN ORGANIZED HEALTH CARE EDUCATION/TRAINING PROGRAM

## 2019-04-16 PROCEDURE — 700102 HCHG RX REV CODE 250 W/ 637 OVERRIDE(OP): Performed by: INTERNAL MEDICINE

## 2019-04-16 RX ORDER — OMEPRAZOLE 20 MG/1
40 CAPSULE, DELAYED RELEASE ORAL DAILY
Status: DISCONTINUED | OUTPATIENT
Start: 2019-04-16 | End: 2019-04-17 | Stop reason: HOSPADM

## 2019-04-16 RX ORDER — THIAMINE MONONITRATE (VIT B1) 100 MG
100 TABLET ORAL DAILY
Status: DISCONTINUED | OUTPATIENT
Start: 2019-04-16 | End: 2019-04-17 | Stop reason: HOSPADM

## 2019-04-16 RX ORDER — HYDRALAZINE HYDROCHLORIDE 20 MG/ML
20 INJECTION INTRAMUSCULAR; INTRAVENOUS EVERY 6 HOURS PRN
Status: DISCONTINUED | OUTPATIENT
Start: 2019-04-16 | End: 2019-04-17 | Stop reason: HOSPADM

## 2019-04-16 RX ORDER — CALCIUM CARBONATE 500 MG/1
500 TABLET, CHEWABLE ORAL DAILY
Status: DISCONTINUED | OUTPATIENT
Start: 2019-04-16 | End: 2019-04-17 | Stop reason: HOSPADM

## 2019-04-16 RX ADMIN — SODIUM CHLORIDE: 9 INJECTION, SOLUTION INTRAVENOUS at 08:20

## 2019-04-16 RX ADMIN — Medication 100 MG: at 08:19

## 2019-04-16 RX ADMIN — SODIUM CHLORIDE: 9 INJECTION, SOLUTION INTRAVENOUS at 15:22

## 2019-04-16 RX ADMIN — PANTOPRAZOLE SODIUM 40 MG: 40 INJECTION, POWDER, LYOPHILIZED, FOR SOLUTION INTRAVENOUS at 05:19

## 2019-04-16 RX ADMIN — THIAMINE HYDROCHLORIDE 100 MG: 100 INJECTION, SOLUTION INTRAMUSCULAR; INTRAVENOUS at 07:52

## 2019-04-16 RX ADMIN — OMEPRAZOLE 40 MG: 20 CAPSULE, DELAYED RELEASE ORAL at 10:38

## 2019-04-16 RX ADMIN — ANTACID TABLETS 500 MG: 500 TABLET, CHEWABLE ORAL at 10:38

## 2019-04-16 RX ADMIN — SODIUM CHLORIDE: 9 INJECTION, SOLUTION INTRAVENOUS at 00:16

## 2019-04-16 ASSESSMENT — ENCOUNTER SYMPTOMS
TREMORS: 0
MYALGIAS: 0
HEMOPTYSIS: 0
DIZZINESS: 0
NAUSEA: 0
SPUTUM PRODUCTION: 0
HEARTBURN: 0
COUGH: 0
HEADACHES: 0
CHILLS: 0
DOUBLE VISION: 0
DEPRESSION: 1
DIARRHEA: 0
HALLUCINATIONS: 0
ABDOMINAL PAIN: 0
BLURRED VISION: 0
TINGLING: 0
FEVER: 0
INSOMNIA: 0
NERVOUS/ANXIOUS: 0
CONSTIPATION: 0
NECK PAIN: 0
VOMITING: 0
PALPITATIONS: 0

## 2019-04-16 ASSESSMENT — LIFESTYLE VARIABLES: SUBSTANCE_ABUSE: 0

## 2019-04-16 NOTE — DISCHARGE PLANNING
Anticipated Discharge Disposition: Inpt psych    Action: CM spoke with Dr. Monique who states that pt is medically cleared from her standpoint and she will complete legal hold paperwork.   CM called the [facility] and spoke with Angelica who states that pt is not service connected in [place]. CM received return phone call from Janeth at [facility] and she offered to assist with d/c planning.  CM met with pt at bedside. Pt states that he came to [place] for a bowling tournament and was staying at the [facility]. Pt states that he arrived here on Sunday and does not have any family or friends here.   Pt states that he is from [place], [place] and is 100% service connected with the [facility]. He does not have additional insurance besides the [facility]. He reports that he lives with his dtr Marry([phone]).   Pts address is [address] [place] [address] in [facility] [address]. His phone number is [phone]. Pt provided his social security number to this CM also and CM called admitting to update face sheet with this information.  Pts truck is parked at the [facility] and has all of his belongings in it including the keys. Pt states that it is not locked, and he would like to get his dentures and glasses if possible. It is a 2004 [employer], white with purple and black graphics on the side and it is parked to the left of the main entrance of [facility].   Pt is in agreement to have Janeth at the [facility] assist with getting pt placed at discharge. Pt would also like this CM to call his dtr Marry to let her know that he is ok. Pt reports that his phone is in the truck too so he is sure she is probably worried.   CM called Janeth at the [facility] and left a message to see if she is able to coordinate transfer to the [facility] and provide them with [facility] connected information.   CM called pts dtr Marry([phone]) and she states that she knows pt is here and there is a friend of the  family who is going to get pts truck tonight and she will see if they can get pts dentures and glasses for him and then contact this CM .   Marry states that they plan on coming to [place] this weekend even if pt is not allowed to have visitors.   CM updated pt about his truck.    Barriers to Discharge:     Plan: Await call back from MIKAL Fowler at the [facility] to try to assist with [facility] the [facility] here [facility] [place]. If this is not possible, pt will most likely transfer to [facility]. CM will monitor for completion of legal hold paperwork.

## 2019-04-16 NOTE — PROGRESS NOTES
Bedside report received. Assumed care of pt. Pt sitting up in bed. No signs of distress, no complaints of pain at this time. Tele box on.  Pt on legal hold. All excess cords removed from room, call light out of room, sitter at bedside. Will continue to monitor.

## 2019-04-16 NOTE — DISCHARGE PLANNING
Anticipated Discharge Disposition: Inpt psych    Action: Pt is on a legal hold. MILLI received call from MIKAL Pedroza in ER that a person named Janeth Whitt is wanting to know if pt is here at [facility], but we need pt's permission to release this info.   CM met with pt at bedside and asked if he would like for this person to know that he is here at [facility] and pt stated yes he does.   MILLI called Janeth([phone]) to let her know that pt is hospitalized at [facility].   Pt's hold has been extended for one week.    Barriers to Discharge:     Plan: Monitor for medical clearance and then send referrals to inpt psych.

## 2019-04-16 NOTE — PROGRESS NOTES
Assumed care of pt, bedside report received.  Pt denies chest pain or SOB.  VSS.  Pt resting comfortably.  Pt is L2K with 1:1 sitter at bedside. All needs met.  Bed low and locked.  Discussed POC.

## 2019-04-16 NOTE — CARE PLAN
Problem: Communication  Goal: The ability to communicate needs accurately and effectively will improve  Outcome: PROGRESSING AS EXPECTED  Patient communicates effectively.  All needs met. Will continue to monitor.       Problem: Safety  Goal: Will remain free from injury  Outcome: PROGRESSING AS EXPECTED  Bed in lowest, locked position.  Treaded slipper socks on, appropriate signs in place, SCD's in place.  Sitter at bedside, 1:1.

## 2019-04-16 NOTE — PROGRESS NOTES
Internal Medicine Interval Note  Note Author: Talia Villegas M.D.     Name Alonzo San     1960   Age/Sex 58 y.o. male   MRN [id number]   Code Status Full     After 5PM or if no immediate response to page, please call for cross-coverage  Attending/Team: Dr. Faulkner/KENNY morrison See Patient List for primary contact information  Call [phone] to page    1st Call - Day Intern (R1):   Dr. Villegas 2nd Call - Day Sr. Resident (R2/R3):   Dr. Monique         Reason for interval visit  (Principal Problem)   Intentional Ibuprofen OD for self harm      Interval Problem Daily Status Update  (24 hours, problem oriented, brief subjective history, new lab/imaging data pertinent to that problem)   Overnight:  No acute over night events, passed bed side swallow eval. On regular diet with no sharps. No withdrawal symptoms.     Today's events:  -On legal hold.   - Vitals stable.  -Completely alert and oriented now, able to give history. He is a [facility] patient, has been feeling down lately. He ran out/ stopped taking his medications ( Thyroid supplementation: not sure of dose, Gabapentin: not sure of dose, Diclofenac for 'pain', other few medications he doesn't remember). Per patient when he stops his meds he feels 'down and low'.   - Patient is [facility] patient, initially psych recommended transfer to VA, however they couldn't evaluate the patient as he was obtunded on admission. A&O x 4 today, pending psych re-eval.   - FOBT +, no abd pain/ nausea/ vomiting. Hemodynamically stable. Tolerating regular diet.  - Medically clear for transfer to [facility] once cleared by psych. Talked about transfer to VA with patient, he agrees.   - Pharmacy consulted for med rec    Review of Systems   Constitutional: Negative for chills and fever.   HENT: Negative for hearing loss and tinnitus.    Eyes: Negative for blurred vision and double vision.   Respiratory: Negative for cough, hemoptysis and sputum production.    Cardiovascular: Negative for  chest pain, palpitations and leg swelling.   Gastrointestinal: Negative for abdominal pain, constipation, diarrhea, heartburn, nausea and vomiting.   Genitourinary: Negative for dysuria, hematuria and urgency.   Musculoskeletal: Negative for myalgias and neck pain.   Neurological: Negative for dizziness, tingling, tremors and headaches.   Psychiatric/Behavioral: Positive for depression and suicidal ideas. Negative for hallucinations and substance abuse. The patient is not nervous/anxious and does not have insomnia.        Disposition/Barriers to discharge:   Psych eval/clearance for transfer     Consultants/Specialty  Psychiatry   PCP: No primary care provider on file.      Quality Measures  Quality-Core Measures   Reviewed items::  EKG reviewed, Labs reviewed, Medications reviewed and Radiology images reviewed  Merritt catheter::  No Merritt  DVT prophylaxis - mechanical:  SCDs          Physical Exam       Vitals:    04/15/19 2121 04/15/19 2320 04/16/19 0352 04/16/19 0800   BP:  158/90 128/78 130/73   Pulse: 74 69 70 70   Resp: 18 17 17 14   Temp:  36.9 °C (98.4 °F) 36.4 °C (97.6 °F) 37.1 °C (98.7 °F)   TempSrc:  Temporal Temporal Temporal   SpO2: 94% 97% 96% 95%   Weight:       Height:         Body mass index is 36.69 kg/m². Weight: 103.1 kg (227 lb 4.7 oz)  Oxygen Therapy:  Pulse Oximetry: 95 %, O2 (LPM): 0, O2 Delivery: None (Room Air)    Physical Exam   Constitutional: He is oriented to person, place, and time and well-developed, well-nourished, and in no distress.   HENT:   Head: Normocephalic and atraumatic.   Eyes: Conjunctivae are normal. Right eye exhibits no discharge. Left eye exhibits no discharge.   Neck: Normal range of motion. Neck supple.   Cardiovascular: Normal rate, regular rhythm, normal heart sounds and intact distal pulses.    Pulmonary/Chest: Effort normal and breath sounds normal. No respiratory distress. He has no wheezes.   Abdominal: Soft. Bowel sounds are normal. He exhibits no distension.  There is no tenderness.   Musculoskeletal: Normal range of motion. He exhibits no edema.   Neurological: He is alert and oriented to person, place, and time.   Skin: Skin is warm. No erythema.   Psychiatric: Affect and judgment normal.             Assessment/Plan     * Ibuprofen poisoning, intentional self-harm, initial encounter ([facility])   Assessment & Plan    S/P 40 of 800 mg Ibuprofen pills, initially obtunded on admission, mentation cleared up later. Breathing comfortably in RA.   - Given that he took 40 of 800 mg pills, (299 mg/kg for his body weight), it's unlikely to cause toxicity. He denies taking anything else.   - FOBT positive, no gross blood in stool. H&H stable. He needs F/U with PCP later to monitor.   - Continue maintenance fluid with 150ml/hour for nutrition and kidney protection. No signs of volume overload on admission.   - IV protonix changed to PO Omeprazole for GI protection   - Cont Legal Hold  - Psych to re-eval today.   - Fall, Aspiration, seizure precaution.   - Dc'd telemetry, okay to [facility] to [facility] when cleared by psych      Somnolence   Assessment & Plan    See Ibuprofen overdose  RESOLVED. Alert and oriented this AM     Alcoholic intoxication with complication ([facility])   Assessment & Plan    BAL on admission 0.11. Started CIWA protocol on admission for monitoring withdrawal status, so far no withdrawal symptoms..   - Start Ativan per CIWA if needed  - PO vitamins per protocol  - Seizure, Fall, aspiration precaution  - Passed bed side swallow eval, regular diet with no sharp as tolerated       Thyroid disease   Assessment & Plan    H/O thyroid disease, no records in file. Unsure how much supplementation he was on.   - TSH with reflex to T4 ordered.   - Start supplementation accordingly.      Increased anion gap metabolic acidosis   Assessment & Plan    Anion gap metabolic acidosis probably 2/2 ibuprofen OD  - Trending down  - Bal mildly elevated, not known DM. Blood glucose normal on  admission. Salicylate/acetamenophen neg   - LA WNL.

## 2019-04-16 NOTE — DISCHARGE PLANNING
Legal Hold     Referral: Legal Hold Court     Intervention: Pt presented for legal hold meeting with  to discuss legal options of contesting hold and meeting with the court doctors tomorrow afternoon, or not contesting legal hold and continuing the hold for one week.  advised that pt's legal hold will be be continued for one week (4/25).       Plan: Pt's legal hold has been continued for one week. Pt awaiting transfer to an in patient psych facility.

## 2019-04-16 NOTE — CARE PLAN
Problem: Communication  Goal: The ability to communicate needs accurately and effectively will improve    Intervention: Educate patient and significant other/support system about the plan of care, procedures, treatments, medications and allow for questions  Pt aware of plan of care.       Problem: Infection  Goal: Will remain free from infection    Intervention: Implement standard precautions and perform hand washing before and after patient contact  Standard precautions in place.

## 2019-04-17 VITALS
SYSTOLIC BLOOD PRESSURE: 167 MMHG | TEMPERATURE: 97.7 F | WEIGHT: 228.84 LBS | DIASTOLIC BLOOD PRESSURE: 100 MMHG | HEART RATE: 75 BPM | BODY MASS INDEX: 36.78 KG/M2 | OXYGEN SATURATION: 94 % | HEIGHT: 66 IN | RESPIRATION RATE: 20 BRPM

## 2019-04-17 LAB
ANION GAP SERPL CALC-SCNC: 8 MMOL/L (ref 0–11.9)
BUN SERPL-MCNC: 17 MG/DL (ref 8–22)
CALCIUM SERPL-MCNC: 7.6 MG/DL (ref 8.5–10.5)
CHLORIDE SERPL-SCNC: 110 MMOL/L (ref 96–112)
CO2 SERPL-SCNC: 20 MMOL/L (ref 20–33)
CREAT SERPL-MCNC: 1.06 MG/DL (ref 0.5–1.4)
GLUCOSE SERPL-MCNC: 101 MG/DL (ref 65–99)
POTASSIUM SERPL-SCNC: 3.5 MMOL/L (ref 3.6–5.5)
SODIUM SERPL-SCNC: 138 MMOL/L (ref 135–145)

## 2019-04-17 PROCEDURE — A9270 NON-COVERED ITEM OR SERVICE: HCPCS | Performed by: PSYCHIATRY & NEUROLOGY

## 2019-04-17 PROCEDURE — 700102 HCHG RX REV CODE 250 W/ 637 OVERRIDE(OP): Performed by: INTERNAL MEDICINE

## 2019-04-17 PROCEDURE — 36415 COLL VENOUS BLD VENIPUNCTURE: CPT

## 2019-04-17 PROCEDURE — 700102 HCHG RX REV CODE 250 W/ 637 OVERRIDE(OP): Performed by: PSYCHIATRY & NEUROLOGY

## 2019-04-17 PROCEDURE — A9270 NON-COVERED ITEM OR SERVICE: HCPCS | Performed by: STUDENT IN AN ORGANIZED HEALTH CARE EDUCATION/TRAINING PROGRAM

## 2019-04-17 PROCEDURE — 99232 SBSQ HOSP IP/OBS MODERATE 35: CPT | Performed by: PSYCHIATRY & NEUROLOGY

## 2019-04-17 PROCEDURE — 99239 HOSP IP/OBS DSCHRG MGMT >30: CPT | Mod: GC | Performed by: INTERNAL MEDICINE

## 2019-04-17 PROCEDURE — A9270 NON-COVERED ITEM OR SERVICE: HCPCS | Performed by: INTERNAL MEDICINE

## 2019-04-17 PROCEDURE — 80048 BASIC METABOLIC PNL TOTAL CA: CPT

## 2019-04-17 PROCEDURE — 700102 HCHG RX REV CODE 250 W/ 637 OVERRIDE(OP): Performed by: STUDENT IN AN ORGANIZED HEALTH CARE EDUCATION/TRAINING PROGRAM

## 2019-04-17 PROCEDURE — 700105 HCHG RX REV CODE 258: Performed by: STUDENT IN AN ORGANIZED HEALTH CARE EDUCATION/TRAINING PROGRAM

## 2019-04-17 RX ORDER — POTASSIUM CHLORIDE 20 MEQ/1
40 TABLET, EXTENDED RELEASE ORAL ONCE
Status: COMPLETED | OUTPATIENT
Start: 2019-04-17 | End: 2019-04-17

## 2019-04-17 RX ORDER — OMEPRAZOLE 40 MG/1
40 CAPSULE, DELAYED RELEASE ORAL DAILY
Qty: 21 CAP | Refills: 0 | Status: SHIPPED | OUTPATIENT
Start: 2019-04-18

## 2019-04-17 RX ORDER — CALCIUM CARBONATE 500 MG/1
500 TABLET, CHEWABLE ORAL DAILY
Qty: 30 TAB | Refills: 0 | Status: SHIPPED | OUTPATIENT
Start: 2019-04-18

## 2019-04-17 RX ORDER — AMITRIPTYLINE HYDROCHLORIDE 75 MG/1
75 TABLET ORAL EVERY EVENING
Qty: 30 TAB | Refills: 0 | Status: SHIPPED | OUTPATIENT
Start: 2019-04-17

## 2019-04-17 RX ADMIN — AMITRIPTYLINE HYDROCHLORIDE 75 MG: 50 TABLET, FILM COATED ORAL at 17:30

## 2019-04-17 RX ADMIN — SODIUM CHLORIDE: 9 INJECTION, SOLUTION INTRAVENOUS at 01:33

## 2019-04-17 RX ADMIN — POTASSIUM CHLORIDE 40 MEQ: 1500 TABLET, EXTENDED RELEASE ORAL at 06:40

## 2019-04-17 RX ADMIN — Medication 100 MG: at 05:38

## 2019-04-17 RX ADMIN — OMEPRAZOLE 40 MG: 20 CAPSULE, DELAYED RELEASE ORAL at 05:38

## 2019-04-17 RX ADMIN — ANTACID TABLETS 500 MG: 500 TABLET, CHEWABLE ORAL at 05:38

## 2019-04-17 ASSESSMENT — COGNITIVE AND FUNCTIONAL STATUS - GENERAL
SUGGESTED CMS G CODE MODIFIER MOBILITY: CH
DAILY ACTIVITIY SCORE: 24
MOBILITY SCORE: 24
SUGGESTED CMS G CODE MODIFIER DAILY ACTIVITY: CH

## 2019-04-17 ASSESSMENT — ENCOUNTER SYMPTOMS
DEPRESSION: 1
HALLUCINATIONS: 0
COUGH: 0
PALPITATIONS: 0
HEMOPTYSIS: 0
ABDOMINAL PAIN: 0
DOUBLE VISION: 0
BLURRED VISION: 0
FEVER: 0
MYALGIAS: 0
NERVOUS/ANXIOUS: 0
TINGLING: 0
HEADACHES: 0
HEARTBURN: 0
VOMITING: 0
TREMORS: 0
NECK PAIN: 0
NAUSEA: 0
INSOMNIA: 0
CONSTIPATION: 0
SPUTUM PRODUCTION: 0
CHILLS: 0
DIARRHEA: 0
DIZZINESS: 0

## 2019-04-17 ASSESSMENT — LIFESTYLE VARIABLES
PAROXYSMAL SWEATS: NO SWEAT VISIBLE
HEADACHE, FULLNESS IN HEAD: NOT PRESENT
NAUSEA AND VOMITING: NO NAUSEA AND NO VOMITING
ORIENTATION AND CLOUDING OF SENSORIUM: ORIENTED AND CAN DO SERIAL ADDITIONS
ANXIETY: NO ANXIETY (AT EASE)
TREMOR: NO TREMOR
SUBSTANCE_ABUSE: 0
AUDITORY DISTURBANCES: NOT PRESENT
VISUAL DISTURBANCES: NOT PRESENT
DO YOU DRINK ALCOHOL: NO
TOTAL SCORE: 0
AGITATION: NORMAL ACTIVITY

## 2019-04-17 ASSESSMENT — PATIENT HEALTH QUESTIONNAIRE - PHQ9
2. FEELING DOWN, DEPRESSED, IRRITABLE, OR HOPELESS: NOT AT ALL
1. LITTLE INTEREST OR PLEASURE IN DOING THINGS: NOT AT ALL
SUM OF ALL RESPONSES TO PHQ9 QUESTIONS 1 AND 2: 0

## 2019-04-17 NOTE — PROGRESS NOTES
Patient requesting home medications be restarted. However, he does not know names or doses, only what they are used for (depression, anxiety, and neuropathy). [facility] [place], [place]. The number is [phone]. Spoke to a women in the call center and she transferred me twice to the PCP with no answer. Advised me to call back later.

## 2019-04-17 NOTE — DISCHARGE PLANNING
Spoke to [facility] resident and adv me Dr. Monique is on her way to certify legal hold. Once legal hold is certified will complete filing to the court and send copy to [facility] for referrals to be sent.

## 2019-04-17 NOTE — DISCHARGE PLANNING
Dr. Gonzales d/c legal hold as of today at 1448.  Removed pt from legal hold.  Notice of withdrawal filed with the court via e[employer].  Waiting on verified notice.

## 2019-04-17 NOTE — DISCHARGE PLANNING
Anticipated Discharge Disposition: Home with outpatient f/u    Action: MILLI was informed that pts legal hold has been d/c'd and there is also an order now to get pt an appt with his psychiatrist in [place] for follow up.   CM called Janeth at the [facility] and left a message to please call this CM back so that an appt can be scheduled before discharge.    Barriers to Discharge:     Plan: Await return call from Janeth at [facility].

## 2019-04-17 NOTE — PROGRESS NOTES
Internal Medicine Interval Note  Note Author: Talia Villegas M.D.     Name Alonzo San     1960   Age/Sex 58 y.o. male   MRN [id number]   Code Status Full     After 5PM or if no immediate response to page, please call for cross-coverage  Attending/Team: Dr. Faulkner/KENNY morrison See Patient List for primary contact information  Call [phone] to page    1st Call - Day Intern (R1):   Dr. Villegas 2nd Call - Day Sr. Resident (R2/R3):   Dr. Monique         Reason for interval visit  (Principal Problem)   Intentional Ibuprofen OD for self harm      Interval Problem Daily Status Update  (24 hours, problem oriented, brief subjective history, new lab/imaging data pertinent to that problem)   - On legal hold. Vitals stable. dc'd IVF.   - [facility].   - No active medical complains. Psych to re-evaluate  - Medically clear     Review of Systems   Constitutional: Negative for chills and fever.   HENT: Negative for hearing loss and tinnitus.    Eyes: Negative for blurred vision and double vision.   Respiratory: Negative for cough, hemoptysis and sputum production.    Cardiovascular: Negative for chest pain, palpitations and leg swelling.   Gastrointestinal: Negative for abdominal pain, constipation, diarrhea, heartburn, nausea and vomiting.   Genitourinary: Negative for dysuria, hematuria and urgency.   Musculoskeletal: Negative for myalgias and neck pain.   Neurological: Negative for dizziness, tingling, tremors and headaches.   Psychiatric/Behavioral: Positive for depression and suicidal ideas. Negative for hallucinations and substance abuse. The patient is not nervous/anxious and does not have insomnia.        Disposition/Barriers to discharge:   Awaiting transfer    Consultants/Specialty  Psychiatry   PCP: No primary care provider on file.      Quality Measures  Quality-Core Measures   Reviewed items::  EKG reviewed, Labs reviewed, Medications  reviewed and Radiology images reviewed  Merritt catheter::  No Merritt  DVT prophylaxis - mechanical:  SCDs          Physical Exam       Vitals:    04/16/19 2018 04/17/19 0338 04/17/19 0805 04/17/19 0900   BP: (!) 166/101 156/93 (!) 171/95 152/95   Pulse: 73 62 72 74   Resp: 17 18 20    Temp: 36.6 °C (97.9 °F) 36.5 °C (97.7 °F) 36.4 °C (97.6 °F)    TempSrc: Temporal Temporal Temporal    SpO2: 94% 97% 95%    Weight: 103.8 kg (228 lb 13.4 oz)      Height:         Body mass index is 36.94 kg/m². Weight: 103.8 kg (228 lb 13.4 oz)  Oxygen Therapy:  Pulse Oximetry: 95 %, O2 (LPM): 0, O2 Delivery: None (Room Air)    Physical Exam   Constitutional: He is oriented to person, place, and time and well-developed, well-nourished, and in no distress.   HENT:   Head: Normocephalic and atraumatic.   Eyes: Conjunctivae are normal. Right eye exhibits no discharge. Left eye exhibits no discharge.   Neck: Normal range of motion. Neck supple.   Cardiovascular: Normal rate, regular rhythm, normal heart sounds and intact distal pulses.    Pulmonary/Chest: Effort normal and breath sounds normal. No respiratory distress. He has no wheezes.   Abdominal: Soft. Bowel sounds are normal. He exhibits no distension. There is no tenderness.   Musculoskeletal: Normal range of motion. He exhibits no edema.   Neurological: He is alert and oriented to person, place, and time.   Skin: Skin is warm. No erythema.   Psychiatric: Affect and judgment normal.             Assessment/Plan     * Ibuprofen poisoning, intentional self-harm, initial encounter ([facility])   Assessment & Plan    S/P 40 of 800 mg Ibuprofen pills, initially obtunded on admission, mentation cleared up later. Breathing comfortably in RA.   - Given that he took 40 of 800 mg pills, (299 mg/kg for his body weight), it's unlikely to cause toxicity. He denies taking anything else.   - FOBT positive, no gross blood in stool. H&H stable. He needs F/U with PCP later to monitor.   - Continue maintenance  fluid with 150ml/hour for nutrition and kidney protection. No signs of volume overload on admission.   - IV protonix changed to PO Omeprazole for GI protection   - Cont Legal Hold  - Psych to re-eval today.   - Fall, Aspiration, seizure precaution.   - Dc'd telemetry, [facility]     Somnolence   Assessment & Plan    See Ibuprofen overdose  RESOLVED. Alert and oriented this AM     Alcoholic intoxication with complication (HCC)   Assessment & Plan    BAL on admission 0.11. Started CIWA protocol on admission for monitoring withdrawal status, so far no withdrawal symptoms..   - Start Ativan per CIWA if needed  - PO vitamins per protocol  - Seizure, Fall, aspiration precaution  - Passed bed side swallow eval, regular diet with no sharp as tolerated       Hypocalcemia   Assessment & Plan    Ca 7.3 today, Albumin normal. Asymptomatic. Started Tums for Ca supplementation.      Thyroid disease   Assessment & Plan    H/O thyroid disease, no records in file. Unsure how much supplementation he was on.   - TSH normal, will start supplementation when med rec is done        Increased anion gap metabolic acidosis   Assessment & Plan    Anion gap metabolic acidosis probably 2/2 ibuprofen OD  - Trending down  - Bal mildly elevated, not known DM. Blood glucose normal on admission. Salicylate/acetamenophen neg   - LA WNL.

## 2019-04-17 NOTE — PROGRESS NOTES
Bedside report received. Assumed care of pt. Pt sitting up in bed, A&O X4. No signs of distress, no complaints of pain at this time. Legal hold, sitter at bedside, precautions in place.

## 2019-04-17 NOTE — PROGRESS NOTES
Page [facility] blue team on call  about BP. Received call back from , stated they will review chart and possibly put orders in for BP.

## 2019-04-17 NOTE — PROGRESS NOTES
Patient is being discharged. [facility] the [facility] that [facility] an appointment with  tomorrow at 1430.

## 2019-04-17 NOTE — DISCHARGE INSTRUCTIONS
Discharge Instructions    Continue taking regular medications at home as prescribed EXCEPT antidepressant, because the doctor started you on a new one    Discharged to home by car with relative. Discharged via wheelchair, hospital escort: Yes.  Special equipment needed: Not Applicable    Be sure to schedule a follow-up appointment with your primary care doctor or any specialists as instructed.     Discharge Plan:   Diet Plan: Discussed  Activity Level: Discussed  Confirmed Follow up Appointment: Appointment Scheduled  Confirmed Symptoms Management: Discussed  Medication Reconciliation Updated: Yes  Influenza Vaccine Indication: Patient Refuses    I understand that a diet low in cholesterol, fat, and sodium is recommended for good health. Unless I have been given specific instructions below for another diet, I accept this instruction as my diet prescription.   Depression / Suicide Risk    As you are discharged from this [facility] facility, it is important to learn how to keep safe from harming yourself.    Recognize the warning signs:  · Abrupt changes in personality, positive or negative- including increase in energy   · Giving away possessions  · Change in eating patterns- significant weight changes-  positive or negative  · Change in sleeping patterns- unable to sleep or sleeping all the time   · Unwillingness or inability to communicate  · Depression  · Unusual sadness, discouragement and loneliness  · Talk of wanting to die  · Neglect of personal appearance   · Rebelliousness- reckless behavior  · Withdrawal from people/activities they love  · Confusion- inability to concentrate     If you or a loved one observes any of these behaviors or has concerns about self-harm, here's what you can do:  · Talk about it- your feelings and reasons for harming yourself  · Remove any means that you might use to hurt yourself (examples: pills, rope, extension cords, firearm)  · Get professional help from the community  (Mental Health, Substance Abuse, psychological counseling)  · Do not be alone:Call your Safe Contact- someone whom you trust who will be there for you.  · Call your local CRISIS HOTLINE 365-1951 or 306-914-3385  · Call your local Children's Mobile Crisis Response Team [place] (469) 910-2336 or www.Phigital  · Call the toll free National Suicide Prevention Hotlines   · National Suicide Prevention Lifeline 215-499-UHUZ (6186)  · National Hope Line Network 800-SUICIDE (344-3373)

## 2019-04-17 NOTE — DISCHARGE PLANNING
Anticipated Discharge Disposition: Inpt psych    Action: CM received call from ENOC Yepez at the [facility]([phone]) and she states that she will be working on pt's case now. Marleni reports that they have a 14 bed inpt mental health unit but as of today they are completely full and she is not sure when they will have a discharge. Marleni will begin working on the transfer process though and let us know what she needs from us later today.     Barriers to Discharge: [facility].     Plan: Await return call from Marleni at the [facility]. Pt remains on legal hold and has been medically cleared. Legal hold paperwork was sent to [facility].

## 2019-04-17 NOTE — CARE PLAN
Problem: Discharge Barriers/Planning  Goal: Patient's continuum of care needs will be met  Outcome: PROGRESSING AS EXPECTED  Educated patient on POC regarding discharge and discussed calling pharmacy to confirm medications.

## 2019-04-17 NOTE — CARE PLAN
Problem: Safety  Goal: Will remain free from injury  Outcome: PROGRESSING AS EXPECTED  Educated patient on fall risk and SI precautions.

## 2019-04-17 NOTE — PSYCHIATRY
PSYCHIATRIC FOLLOW-UP:(established)  *Reason for admission: reports that he took 40 800mg Ibuprofen in attempt to end his life this evening. Pt reports to worsening depression. Pt reports he has had thoughts in the past but never a previous attempt to harm himself. Pt is A&Ox4. Per EMS pt was at home when they were called.                   *Reason for consult: suicide attempt  *Requesting Physician/APN: Manish Howell II, M.D.              *HPI:    Pt reported that he came to [place] from [place] for a [place] tournament. He got intoxicated with alcohol after 2 years of sobriety at a [employer], went to his car and received a text message from his ex-wife asking him if he would consider getting back together. He said that although he loves her, the message made him upset. He also started to think about how he let his kids down, and was not there for them when they were growing up, and currently one of them is going through a difficult time in her life. Pt felt really guilty about while in his truck in the [place] parking lot. He tried to contact his counselor and psychologist without success. He felt there was not one to reach out that he felt safe talking to. At one point, on an impulse, he overdosed on half bottle (the small bottle he said) so they could understand he is not a solid rock. Pt is not sure if he wanted to kill himself when he overdosed, stating that he was very drunk. After OD, he went to the [facility] to ask for help. Pt denied having any SI prior getting intoxicated. He said that he went on a trip to visit some of his children, and that the trip made him guilt and more depressed, but denied having any SI. He has 2 daughters and a dog living with him, and stated he would never kill himself because of them. He identified his 7 children as his main reason to live. He also stated that he has always thought that suicide is a coward way to go. Pt ran out of his psychiatric medications 5 days ago. He reported he  "was taking Wellbutrin, Amtryptyline and Zoloft. He reported for the past 2 weeks worsened depressed mood with guilty feelings, but denied any anhedonia, SI, change in appetite, sleep or energy. No psychomotor retardation. Denied ever experiencing any maniac or psychotic symptoms. No anxiety feelings. Pt has group therapy and individual psychotherapy once a week, and he is planning to continue to go to them. He also stated that his 2 daughters are coming to pick him up. Pt is planning to continue to take his medications and go to his private psychiatrist.  He does not want to drink alcohol again.     Medical ROS (as pertinent):   Denies HA, CP, SOB, denies tremors, abdominal pain, diarrhea or constipation, no N/V.                      *Psychiatric Examination:  Vitals:   Vitals:    04/17/19 0900   BP: 152/95   Pulse: 74   Resp:    Temp:    SpO2:        General Appearance:  man, overweight, wearing hospital gown, watching TV, fair grooming and hygiene  Calm and cooperative  Fair eye contact  Abnormal Movements: no psychomotor retardation or agitation  Gait and Posture: not tested  Speech: normal volume, tone and rhythm   Thought processes:circumstantial   Associations: none  Abnormal or Psychotic Thoughts: denies AVH, no paranoia or delusions elicited   Judgement and Insight: good/good  Orientation: grossly oriented  Recent and Remote Memory: fair  Attention Span and Concentration: intact  Language: fluent in English   Fund of Knowledge:appropriate   Mood and Affect:\"depressed\", constricted  SI/HI:denies SI/HI     Current Facility-Administered Medications   Medication Dose Route Frequency Provider Last Rate Last Dose   • thiamine tablet 100 mg  100 mg Oral DAILY Neil Faulkner M.D.   100 mg at 04/17/19 0538   • omeprazole (PRILOSEC) capsule 40 mg  40 mg Oral DAILY Talia Villegas M.D.   40 mg at 04/17/19 0538   • calcium carbonate (TUMS) chewable tab 500 mg  500 mg Oral DAILY Talia Villegas M.D.   500 mg " at 04/17/19 0538   • hydrALAZINE (APRESOLINE) injection 20 mg  20 mg Intravenous Q6HRS PRN Michael Warren D.O.       • senna-docusate (PERICOLACE or SENOKOT S) 8.6-50 MG per tablet 2 Tab  2 Tab Oral BID Talia Villegas M.D.   Stopped at 04/15/19 0715    And   • polyethylene glycol/lytes (MIRALAX) PACKET 1 Packet  1 Packet Oral QDAY PRN Talia Villegas M.D.        And   • magnesium hydroxide (MILK OF MAGNESIA) suspension 30 mL  30 mL Oral QDAY PRN Talia Villegas M.D.        And   • bisacodyl (DULCOLAX) suppository 10 mg  10 mg Rectal QDAY PRN Talia Villegas M.D.       • Respiratory Care per Protocol   Nebulization Continuous RT Talia Villegas M.D.       • ondansetron (ZOFRAN) syringe/vial injection 4 mg  4 mg Intravenous Q4HRS PRN Talia Villegas M.D.       • LORazepam (ATIVAN) injection 1-2 mg  1-2 mg Intravenous Once PRN Talia Villegas M.D.         Recent Results (from the past 72 hour(s))   DIAGNOSTIC ALCOHOL (BA)    Collection Time: 04/15/19  3:23 AM   Result Value Ref Range    Diagnostic Alcohol 0.11 (H) 0.00 g/dL   CBC WITH DIFFERENTIAL    Collection Time: 04/15/19  3:23 AM   Result Value Ref Range    WBC 9.4 4.8 - 10.8 K/uL    RBC 5.07 4.70 - 6.10 M/uL    Hemoglobin 15.9 14.0 - 18.0 g/dL    Hematocrit 46.9 42.0 - 52.0 %    MCV 92.5 81.4 - 97.8 fL    MCH 31.4 27.0 - 33.0 pg    MCHC 33.9 33.7 - 35.3 g/dL    RDW 43.7 35.9 - 50.0 fL    Platelet Count 310 164 - 446 K/uL    MPV 9.0 9.0 - 12.9 fL    Neutrophils-Polys 48.70 44.00 - 72.00 %    Lymphocytes 38.80 22.00 - 41.00 %    Monocytes 9.60 0.00 - 13.40 %    Eosinophils 1.70 0.00 - 6.90 %    Basophils 0.60 0.00 - 1.80 %    Immature Granulocytes 0.60 0.00 - 0.90 %    Nucleated RBC 0.00 /100 WBC    Neutrophils (Absolute) 4.55 1.82 - 7.42 K/uL    Lymphs (Absolute) 3.63 1.00 - 4.80 K/uL    Monos (Absolute) 0.90 (H) 0.00 - 0.85 K/uL    Eos (Absolute) 0.16 0.00 - 0.51 K/uL    Baso (Absolute) 0.06 0.00 - 0.12 K/uL    Immature Granulocytes (abs) 0.06 0.00 - 0.11  K/uL    NRBC (Absolute) 0.00 K/uL   COMP METABOLIC PANEL    Collection Time: 04/15/19  3:23 AM   Result Value Ref Range    Sodium 139 135 - 145 mmol/L    Potassium 4.0 3.6 - 5.5 mmol/L    Chloride 102 96 - 112 mmol/L    Co2 20 20 - 33 mmol/L    Anion Gap 17.0 (H) 0.0 - 11.9    Glucose 88 65 - 99 mg/dL    Bun 9 8 - 22 mg/dL    Creatinine 1.24 0.50 - 1.40 mg/dL    Calcium 8.9 8.5 - 10.5 mg/dL    AST(SGOT) 45 12 - 45 U/L    ALT(SGPT) 50 2 - 50 U/L    Alkaline Phosphatase 121 (H) 30 - 99 U/L    Total Bilirubin 0.7 0.1 - 1.5 mg/dL    Albumin 4.8 3.2 - 4.9 g/dL    Total Protein 7.6 6.0 - 8.2 g/dL    Globulin 2.8 1.9 - 3.5 g/dL    A-G Ratio 1.7 g/dL   Acetaminophen Level    Collection Time: 04/15/19  3:23 AM   Result Value Ref Range    Acetaminophen -Tylenol <10 10 - 30 ug/mL   SALICYLATE LEVEL    Collection Time: 04/15/19  3:23 AM   Result Value Ref Range    Salicylates, Quant. 0 (L) 15 - 25 mg/dL   ESTIMATED GFR    Collection Time: 04/15/19  3:23 AM   Result Value Ref Range    GFR If African American >60 >60 mL/min/1.73 m 2    GFR If Non African American 60 >60 mL/min/1.73 m 2   VENOUS BLOOD GAS    Collection Time: 04/15/19  4:16 AM   Result Value Ref Range    Venous Bg Ph 7.34 7.31 - 7.45    Venous Bg Pco2 43.5 41.0 - 51.0 mmHg    Venous Bg Po2 49.5 (H) 25.0 - 40.0 mmHg    Venous Bg O2 Saturation 82.1 %    Venous Bg Hco3 23 (L) 24 - 28 mmol/L    Venous Bg Base Excess -3 mmol/L    Body Temp see below Centigrade   Urine Drug Screen    Collection Time: 04/15/19  4:33 AM   Result Value Ref Range    Amphetamines Urine Negative Negative    Barbiturates Negative Negative    Benzodiazepines Negative Negative    Cocaine Metabolite Negative Negative    Methadone Negative Negative    Opiates Negative Negative    Oxycodone Negative Negative    Phencyclidine -Pcp Negative Negative    Propoxyphene Negative Negative    Cannabinoid Metab Negative Negative   URINALYSIS    Collection Time: 04/15/19  4:33 AM   Result Value Ref Range     Color Yellow     Character Clear     Specific Gravity 1.008 <1.035    Ph 5.0 5.0 - 8.0    Glucose Negative Negative mg/dL    Ketones Negative Negative mg/dL    Protein Negative Negative mg/dL    Bilirubin Negative Negative    Urobilinogen, Urine 0.2 Negative    Nitrite Negative Negative    Leukocyte Esterase Negative Negative    Occult Blood Negative Negative    Micro Urine Req see below    EKG    Collection Time: 04/15/19  7:23 AM   Result Value Ref Range    Report       [facility] Emergency Dept.    Test Date:  2019-04-15  Pt Name:    JEAN YEUNG                 Department: ER  MRN:        [id number]                      Room:       [facility]  Gender:     Male                         Technician: [id number]  :        1960                   Requested By:BHASKAR BRIGGS  Order #:    [id number]                    Reading MD:    Measurements  Intervals                                Axis  Rate:       87                           P:          60  IL:         184                          QRS:        30  QRSD:       108                          T:          35  QT:         368  QTc:        443    Interpretive Statements  SINUS RHYTHM  INCOMPLETE RIGHT BUNDLE BRANCH BLOCK  LATE PRECORDIAL R/S TRANSITION  PROBABLE INFERIOR INFARCT, AGE INDETERMINATE  No previous ECG available for comparison     LACTIC ACID    Collection Time: 04/15/19  7:40 AM   Result Value Ref Range    Lactic Acid 1.7 0.5 - 2.0 mmol/L   TROPONIN    Collection Time: 04/15/19  7:40 AM   Result Value Ref Range    Troponin I <0.01 0.00 - 0.04 ng/mL   APTT    Collection Time: 04/15/19 11:44 AM   Result Value Ref Range    APTT 27.5 24.7 - 36.0 sec   Prothrombin Time    Collection Time: 04/15/19 11:44 AM   Result Value Ref Range    PT 15.7 (H) 12.0 - 14.6 sec    INR 1.24 (H) 0.87 - 1.13   Basic Metabolic Panel    Collection Time: 04/15/19  6:21 PM   Result Value Ref Range    Sodium 142 135 - 145 mmol/L    Potassium 3.8 3.6 - 5.5 mmol/L     Chloride 109 96 - 112 mmol/L    Co2 17 (L) 20 - 33 mmol/L    Glucose 81 65 - 99 mg/dL    Bun 12 8 - 22 mg/dL    Creatinine 1.20 0.50 - 1.40 mg/dL    Calcium 7.8 (L) 8.5 - 10.5 mg/dL    Anion Gap 16.0 (H) 0.0 - 11.9   MAGNESIUM    Collection Time: 04/15/19  6:21 PM   Result Value Ref Range    Magnesium 2.4 1.5 - 2.5 mg/dL   ESTIMATED GFR    Collection Time: 04/15/19  6:21 PM   Result Value Ref Range    GFR If African American >60 >60 mL/min/1.73 m 2    GFR If Non African American >60 >60 mL/min/1.73 m 2   OCCULT BLOOD STOOL    Collection Time: 04/15/19  8:36 PM   Result Value Ref Range    Occult Blood Feces Positive (A) Negative   Comp Metabolic Panel    Collection Time: 04/16/19  3:23 AM   Result Value Ref Range    Sodium 142 135 - 145 mmol/L    Potassium 3.8 3.6 - 5.5 mmol/L    Chloride 113 (H) 96 - 112 mmol/L    Co2 18 (L) 20 - 33 mmol/L    Anion Gap 11.0 0.0 - 11.9    Glucose 70 65 - 99 mg/dL    Bun 15 8 - 22 mg/dL    Creatinine 1.28 0.50 - 1.40 mg/dL    Calcium 7.3 (L) 8.5 - 10.5 mg/dL    AST(SGOT) 28 12 - 45 U/L    ALT(SGPT) 34 2 - 50 U/L    Alkaline Phosphatase 91 30 - 99 U/L    Total Bilirubin 0.5 0.1 - 1.5 mg/dL    Albumin 4.0 3.2 - 4.9 g/dL    Total Protein 6.0 6.0 - 8.2 g/dL    Globulin 2.0 1.9 - 3.5 g/dL    A-G Ratio 2.0 g/dL   ESTIMATED GFR    Collection Time: 04/16/19  3:23 AM   Result Value Ref Range    GFR If African American >60 >60 mL/min/1.73 m 2    GFR If Non African American 58 (A) >60 mL/min/1.73 m 2   TSH WITH REFLEX TO FT4    Collection Time: 04/16/19 11:35 AM   Result Value Ref Range    TSH 0.730 0.380 - 5.330 uIU/mL   Basic Metabolic Panel    Collection Time: 04/17/19  2:57 AM   Result Value Ref Range    Sodium 138 135 - 145 mmol/L    Potassium 3.5 (L) 3.6 - 5.5 mmol/L    Chloride 110 96 - 112 mmol/L    Co2 20 20 - 33 mmol/L    Glucose 101 (H) 65 - 99 mg/dL    Bun 17 8 - 22 mg/dL    Creatinine 1.06 0.50 - 1.40 mg/dL    Calcium 7.6 (L) 8.5 - 10.5 mg/dL    Anion Gap 8.0 0.0 - 11.9   ESTIMATED  GFR    Collection Time: 04/17/19  2:57 AM   Result Value Ref Range    GFR If African American >60 >60 mL/min/1.73 m 2    GFR If Non African American >60 >60 mL/min/1.73 m 2     Assessment: 59 yo man, , domiciled with 2 daughters, with reported hx of MDD and PTSD, alcohol use dso, recent relapse, one previous psychiatric admission, no prior SA/SIB, hx of ECT, [facility] Pt was admitted after overdosing on Ibuprofen while intoxicated with alcohol. Although patient reports worsening of depressed mood and guilty feelings after going on a trip to visit his children, pt denied any SI prior relapsing on alcohol and getting intoxicated. His overdose was an impulse he states. He also had not taken his antidepressants for the past 5 days. Pt is at chronic elevated risk of danger to self due to hx of mental illness and alcohol use, however pt at this time is at low acute risk of danger to self, as he is now sober, and denies any SI/HI, method, plan or intention to harm himself. He is future oriented, is identifying reasons to live, is able to advocate for his own needs, and is connected to outpatient psychiatric services. He has no hx of SA/SIIB, and is domiciled with good family support. Pt does not meet criteria for a legal hold at this time.     Hx of Major depressive dso, recurrent, moderate, without psychotic symptoms  Hx of PTSD  Alcohol use dso  Substance induced mood dso    Plan:  -legal hold discontinued  -Re-start Amitriptyline 75mg PO daily for depression. Pt was advised to not be on more than one antidepressant due to risk of having SS. He also should not be on Wellbutrin due to alcohol use and higher risk of seizures.   -Please assist pt with getting an outpatient appointment with his private psychiatrist. Please do not discharge pt without an appointment for continuity of care and safe discharge  -Pt was encouraged to continue psychotherapy in the outpatient  setting  psychiatry signing off. Please re-consult if needed    Thank you for the consult.

## 2019-04-17 NOTE — CARE PLAN
Problem: Communication  Goal: The ability to communicate needs accurately and effectively will improve    Intervention: Educate patient and significant other/support system about the plan of care, procedures, treatments, medications and allow for questions  Pt aware of plan of care for the day.       Problem: Venous Thromboembolism (VTW)/Deep Vein Thrombosis (DVT) Prevention:  Goal: Patient will participate in Venous Thrombosis (VTE)/Deep Vein Thrombosis (DVT)Prevention Measures    Intervention: Ensure patient wears graduated elastic stockings (LOLLY hose) and/or SCDs, if ordered, when in bed or chair (Remove at least once per shift for skin check)  Pt has SCDs on.

## 2019-04-17 NOTE — DISCHARGE PLANNING
Per Autumn, legal hold CCA, certification has not been signed on the legal hold. MILLI sent message to Dr. Monique to see if she is able to do this. Marleni at [facility] is requesting referral be sent to them now so as soon as this has been completed Rosario DARDEN can send referral.

## 2019-04-17 NOTE — DISCHARGE SUMMARY
Internal Medicine Discharge Summary  Note Author: Talia Villegas M.D.       Name Alonzo San 1960   Age/Sex 58 y.o. male   MRN [id number]         Admit Date:  4/15/2019       Discharge Date:   2019    Service:   [facility] Internal Medicine Blue Team  Attending Physician(s):   Dr. Faulkner     Senior Resident(s):   Dr. Monique  Christopher Resident(s):   Dr. Villegas  PCP: No primary care provider on file.      Primary Diagnosis:   Ibuprofen overdose  Alcohol intoxication     Secondary Diagnoses:                Principal Problem:    Ibuprofen poisoning, intentional self-harm, initial encounter (HCC) POA: Unknown  Active Problems:    Somnolence POA: Unknown    Alcoholic intoxication with complication (HCC) POA: Unknown    Increased anion gap metabolic acidosis POA: Unknown    Thyroid disease POA: Unknown    Hypocalcemia POA: Unknown  Resolved Problems:    * No resolved hospital problems. *      Hospital Summary (Brief Narrative):       This is a 58 years old male [facility] by EMS for ibuprofen overdose.  On admission patient was somnolent during interview, unable to get any history from patient.  However initially he was more alert when he mentioned to the nursing staff that he took around 40 pills of 800 mg ibuprofen because he was feeling very low.  His blood alcohol level was slightly elevated. Vitals were normal, labs were grossly normal except elevated anion gap with a normal pH.  Venous blood gas was obtained which did not show any evidence of CO2 retention. UDS, salicylates, acetaminophen level was negative. FOBT was positive. He received 80 mg of IV pantoprazole later PO Omeprazole for GI protection and maintenance fluid was started for kidney protection. Poison control was contacted from ER. He was placed on legal hold and psychiatry was consulted.  He was monitored in telemetry under [facility] protocol, no rhythm abnormality was noted.mentation improved,  breathing was good and room air.  He did not have any withdrawal symptoms and did not require any Ativan . Legal hold was discontinued by psychiatry, amitriptyline 75 mg oral was restarted for depression today.  Patient was advised not to be on more than one antidepressant due to risk of having SS.  He should not be on Wellbutrin due to alcohol use and high risk of seizures. Patient scheduled an appointment with his psychiatrist tomorrow at 2:30 PM which was confirmed by nursing staff.  He is being discharged today in a good health stable mental condition.     Patient /Hospital Summary (Details -- Problem Oriented) :        Somnolence:  Alcohol intoxication:  Ibuprofen overdose:   Suicidal ideation: See hospital course.   Hypocalcemia: Started Tums for calcium supplementation.  Asymptomatic, follow-up with PCP for monitoring.  Thyroid disease: History of thyroid disease, no records on file. Unsure how much supplementation he was on, patient could not provide any details. TSH was normal on admission. [facility]. [facility] with PCP.    Consultants:     Psychiatry: Dr. Gonzales     Procedures:        NA    Imaging/ Testing:      No orders to display       Discharge Medications:         Medication Reconciliation: Completed       Medication List      START taking these medications      Instructions   amitriptyline 75 MG Tabs  Commonly known as:  ELAVIL   Take 1 Tab by mouth every evening.  Dose:  75 mg     calcium carbonate 500 MG Chew  Start taking on:  4/18/2019  Commonly known as:  TUMS   Take 1 Tab by mouth every day.  Dose:  500 mg     omeprazole 40 MG delayed-release capsule  Start taking on:  4/18/2019  Commonly known as:  PRILOSEC   Take 1 Cap by mouth every day.  Dose:  40 mg            Disposition:   Home    Diet:   Normal    Activity:   Normal as tolerated    Instructions:        The patient was instructed to return to the ER in the event of worsening symptoms. I have counseled  the patient on the importance of compliance and the patient has agreed to proceed with all medical recommendations and follow up plan indicated above.   The patient understands that all medications come with benefits and risks. Risks may include permanent injury or death and these risks can be minimized with close reassessment and monitoring.        Primary Care Provider:    Discharge summary faxed to primary care provider:  Deferred  Copy of discharge summary given to the patient: Deferred      Follow up appointment details :      Follow up with Dr. Grissom (psychiatry)  tomorrow 04/18/2019 at 1430.  F/U with PCP in 1 month  No follow-up provider specified.      Pending Studies:        None    Time spent on discharge day patient visit, preparing discharge paperwork and arranging for patient follow up.    Summary of follow up issues:   Follow up on Positive FOBT and calcium level  Discharge Time (Minutes) :    35 minutes  Hospital Course Type:  Inpatient Stay >2 midnights      Condition on Discharge    ______________________________________________________________________    Interval history/exam for day of discharge:    Patient lying comfortably in bed, no acute complains.   Cleared for dc by psychiatry   Appointment made by patient to f/u with his psychiatrist which was confirmed by nursing staff      Physical Exam   Constitutional: He is oriented to person, place, and time and well-developed, well-nourished, and in no distress.   HENT:   Head: Normocephalic and atraumatic.   Eyes: Conjunctivae are normal. Right eye exhibits no discharge. Left eye exhibits no discharge.   Neck: Normal range of motion. Neck supple.   Cardiovascular: Normal rate, regular rhythm, normal heart sounds and intact distal pulses.    Pulmonary/Chest: Effort normal and breath sounds normal. No respiratory distress. He has no wheezes.   Abdominal: Soft. Bowel sounds are normal. He exhibits no distension. There is no tenderness.    Musculoskeletal: Normal range of motion. He exhibits no edema.   Neurological: He is alert and oriented to person, place, and time.   Skin: Skin is warm. No erythema.   Psychiatric: Affect and judgment normal.     Most Recent Labs:    Lab Results   Component Value Date/Time    WBC 9.4 04/15/2019 03:23 AM    RBC 5.07 04/15/2019 03:23 AM    HEMOGLOBIN 15.9 04/15/2019 03:23 AM    HEMATOCRIT 46.9 04/15/2019 03:23 AM    MCV 92.5 04/15/2019 03:23 AM    MCH 31.4 04/15/2019 03:23 AM    MCHC 33.9 04/15/2019 03:23 AM    MPV 9.0 04/15/2019 03:23 AM    NEUTSPOLYS 48.70 04/15/2019 03:23 AM    LYMPHOCYTES 38.80 04/15/2019 03:23 AM    MONOCYTES 9.60 04/15/2019 03:23 AM    EOSINOPHILS 1.70 04/15/2019 03:23 AM    BASOPHILS 0.60 04/15/2019 03:23 AM      Lab Results   Component Value Date/Time    SODIUM 138 04/17/2019 02:57 AM    POTASSIUM 3.5 (L) 04/17/2019 02:57 AM    CHLORIDE 110 04/17/2019 02:57 AM    CO2 20 04/17/2019 02:57 AM    GLUCOSE 101 (H) 04/17/2019 02:57 AM    BUN 17 04/17/2019 02:57 AM    CREATININE 1.06 04/17/2019 02:57 AM      Lab Results   Component Value Date/Time    ALTSGPT 34 04/16/2019 03:23 AM    ASTSGOT 28 04/16/2019 03:23 AM    ALKPHOSPHAT 91 04/16/2019 03:23 AM    TBILIRUBIN 0.5 04/16/2019 03:23 AM    ALBUMIN 4.0 04/16/2019 03:23 AM    GLOBULIN 2.0 04/16/2019 03:23 AM    INR 1.24 (H) 04/15/2019 11:44 AM     Lab Results   Component Value Date/Time    PROTHROMBTM 15.7 (H) 04/15/2019 11:44 AM    INR 1.24 (H) 04/15/2019 11:44 AM

## 2019-04-18 NOTE — PROGRESS NOTES
Patient was discharged, awaiting for daughter to pick him up. Paperword completed and IV out. Went into to room to give bedside report. Patient not in room. Belongings were gone, but d/c paperwork with rx were still at bedside. Tried to contact patient with no answer. Contacted daughter since she was driving to pick him. She notified me that patient told her he was going to  his truck at the [employer] and going to wait at the "[employer]'s nearby. I told her that he wasn't suppose to leave with her and that his paperwork was still here. She said she would come and get it tonight when she got into town.